# Patient Record
Sex: FEMALE | Race: WHITE | NOT HISPANIC OR LATINO
[De-identification: names, ages, dates, MRNs, and addresses within clinical notes are randomized per-mention and may not be internally consistent; named-entity substitution may affect disease eponyms.]

---

## 2017-02-28 ENCOUNTER — APPOINTMENT (OUTPATIENT)
Dept: PULMONOLOGY | Facility: CLINIC | Age: 62
End: 2017-02-28

## 2017-02-28 VITALS
SYSTOLIC BLOOD PRESSURE: 127 MMHG | DIASTOLIC BLOOD PRESSURE: 78 MMHG | BODY MASS INDEX: 48.82 KG/M2 | OXYGEN SATURATION: 95 % | RESPIRATION RATE: 17 BRPM | HEIGHT: 65 IN | WEIGHT: 293 LBS | HEART RATE: 90 BPM

## 2017-08-17 ENCOUNTER — APPOINTMENT (OUTPATIENT)
Dept: PULMONOLOGY | Facility: CLINIC | Age: 62
End: 2017-08-17

## 2017-09-15 ENCOUNTER — APPOINTMENT (OUTPATIENT)
Dept: PULMONOLOGY | Facility: CLINIC | Age: 62
End: 2017-09-15
Payer: MEDICARE

## 2017-09-15 VITALS
OXYGEN SATURATION: 96 % | HEART RATE: 99 BPM | BODY MASS INDEX: 48.82 KG/M2 | HEIGHT: 65 IN | DIASTOLIC BLOOD PRESSURE: 70 MMHG | RESPIRATION RATE: 16 BRPM | SYSTOLIC BLOOD PRESSURE: 130 MMHG | WEIGHT: 293 LBS

## 2017-09-15 PROCEDURE — 94010 BREATHING CAPACITY TEST: CPT

## 2017-09-15 PROCEDURE — 99214 OFFICE O/P EST MOD 30 MIN: CPT | Mod: 25

## 2017-09-15 RX ORDER — IPRATROPIUM BROMIDE 42 UG/1
0.06 SPRAY NASAL
Qty: 1 | Refills: 5 | Status: ACTIVE | COMMUNITY
Start: 2017-09-15 | End: 1900-01-01

## 2017-09-15 RX ORDER — IBUPROFEN 800 MG/1
800 TABLET, FILM COATED ORAL
Qty: 12 | Refills: 0 | Status: ACTIVE | COMMUNITY
Start: 2017-03-17

## 2017-12-21 ENCOUNTER — APPOINTMENT (OUTPATIENT)
Dept: PULMONOLOGY | Facility: CLINIC | Age: 62
End: 2017-12-21

## 2018-01-05 ENCOUNTER — RX RENEWAL (OUTPATIENT)
Age: 63
End: 2018-01-05

## 2018-01-25 ENCOUNTER — APPOINTMENT (OUTPATIENT)
Dept: PULMONOLOGY | Facility: CLINIC | Age: 63
End: 2018-01-25
Payer: MEDICARE

## 2018-01-25 VITALS — SYSTOLIC BLOOD PRESSURE: 130 MMHG | DIASTOLIC BLOOD PRESSURE: 80 MMHG | OXYGEN SATURATION: 98 % | HEART RATE: 96 BPM

## 2018-01-25 PROCEDURE — 94727 GAS DIL/WSHOT DETER LNG VOL: CPT

## 2018-01-25 PROCEDURE — 94060 EVALUATION OF WHEEZING: CPT

## 2018-01-25 PROCEDURE — 99214 OFFICE O/P EST MOD 30 MIN: CPT | Mod: 25

## 2018-01-25 PROCEDURE — 94729 DIFFUSING CAPACITY: CPT

## 2018-07-24 ENCOUNTER — NON-APPOINTMENT (OUTPATIENT)
Age: 63
End: 2018-07-24

## 2018-07-24 ENCOUNTER — APPOINTMENT (OUTPATIENT)
Dept: PULMONOLOGY | Facility: CLINIC | Age: 63
End: 2018-07-24
Payer: MEDICARE

## 2018-07-24 VITALS
HEIGHT: 65 IN | BODY MASS INDEX: 48.82 KG/M2 | DIASTOLIC BLOOD PRESSURE: 75 MMHG | WEIGHT: 293 LBS | HEART RATE: 84 BPM | OXYGEN SATURATION: 97 % | SYSTOLIC BLOOD PRESSURE: 136 MMHG

## 2018-07-24 DIAGNOSIS — M54.9 DORSALGIA, UNSPECIFIED: ICD-10-CM

## 2018-07-24 PROCEDURE — 95012 NITRIC OXIDE EXP GAS DETER: CPT

## 2018-07-24 PROCEDURE — 99214 OFFICE O/P EST MOD 30 MIN: CPT | Mod: 25

## 2018-07-24 PROCEDURE — 94010 BREATHING CAPACITY TEST: CPT

## 2018-07-24 RX ORDER — CLARITHROMYCIN 500 MG/1
500 TABLET, FILM COATED ORAL
Qty: 20 | Refills: 0 | Status: DISCONTINUED | COMMUNITY
Start: 2017-09-15 | End: 2018-07-24

## 2018-08-03 ENCOUNTER — APPOINTMENT (OUTPATIENT)
Dept: ANESTHESIOLOGY | Facility: CLINIC | Age: 63
End: 2018-08-03

## 2018-08-03 ENCOUNTER — OUTPATIENT (OUTPATIENT)
Dept: OUTPATIENT SERVICES | Facility: HOSPITAL | Age: 63
LOS: 1 days | End: 2018-08-03
Payer: MEDICARE

## 2018-08-03 DIAGNOSIS — Z96.651 PRESENCE OF RIGHT ARTIFICIAL KNEE JOINT: Chronic | ICD-10-CM

## 2018-08-03 DIAGNOSIS — Z98.89 OTHER SPECIFIED POSTPROCEDURAL STATES: Chronic | ICD-10-CM

## 2018-08-03 DIAGNOSIS — M54.12 RADICULOPATHY, CERVICAL REGION: ICD-10-CM

## 2018-08-03 DIAGNOSIS — Z96.652 PRESENCE OF LEFT ARTIFICIAL KNEE JOINT: Chronic | ICD-10-CM

## 2018-08-03 PROCEDURE — 62321 NJX INTERLAMINAR CRV/THRC: CPT

## 2018-08-15 ENCOUNTER — EMERGENCY (EMERGENCY)
Facility: HOSPITAL | Age: 63
LOS: 1 days | Discharge: HOME | End: 2018-08-15
Attending: EMERGENCY MEDICINE
Payer: MEDICARE

## 2018-08-15 VITALS
HEIGHT: 65 IN | RESPIRATION RATE: 16 BRPM | HEART RATE: 79 BPM | WEIGHT: 293 LBS | SYSTOLIC BLOOD PRESSURE: 108 MMHG | OXYGEN SATURATION: 99 % | TEMPERATURE: 99 F | DIASTOLIC BLOOD PRESSURE: 72 MMHG

## 2018-08-15 VITALS
HEART RATE: 67 BPM | DIASTOLIC BLOOD PRESSURE: 76 MMHG | SYSTOLIC BLOOD PRESSURE: 119 MMHG | OXYGEN SATURATION: 100 % | RESPIRATION RATE: 18 BRPM | TEMPERATURE: 98 F

## 2018-08-15 DIAGNOSIS — Z96.652 PRESENCE OF LEFT ARTIFICIAL KNEE JOINT: Chronic | ICD-10-CM

## 2018-08-15 DIAGNOSIS — Z98.89 OTHER SPECIFIED POSTPROCEDURAL STATES: Chronic | ICD-10-CM

## 2018-08-15 DIAGNOSIS — Z96.651 PRESENCE OF RIGHT ARTIFICIAL KNEE JOINT: Chronic | ICD-10-CM

## 2018-08-15 PROCEDURE — 99283 EMERGENCY DEPT VISIT LOW MDM: CPT | Mod: 25

## 2018-08-15 PROCEDURE — G0168: CPT | Mod: GC

## 2018-08-15 PROCEDURE — 99283 EMERGENCY DEPT VISIT LOW MDM: CPT | Mod: 25,GC

## 2018-08-15 PROCEDURE — 12011 RPR F/E/E/N/L/M 2.5 CM/<: CPT

## 2018-08-15 RX ADMIN — Medication 1 TABLET(S): at 20:13

## 2018-08-15 NOTE — ED PROVIDER NOTE - MEDICAL DECISION MAKING DETAILS
63-year-old female presenting with left narrow laceration status post dog bite. Minimal bleeding from 3 mm avulsion of the skin. Dog is neighbor's dog with vaccines. Very low concern for rabies. Will copiously irrigate, apply tissue adhesive. 63-year-old female presenting with left narrow laceration status post dog bite. Minimal bleeding from 3 mm avulsion of the skin. Dog is neighbor's dog with vaccines. Very low concern for rabies. Will copiously irrigate, apply tissue adhesive.  Attending Statement: Agree with the above.  Provided c strict return precautions re s/s of infection.  To f/u c PCP.  Wound copiously irrigated prior to closure.  Tdap utd, dog has rabies vaccines.  D/C on augmentin.  --BMM

## 2018-08-15 NOTE — ED PROVIDER NOTE - PLAN OF CARE
You were seen in the Emergency Department for dog bit. Your laceration was repaired. Take the antibiotics as written. Please follow up with your regular physician this week for reevaluation. Please return to the Emergency Department if you have any new concerning symptoms such as severe pain, weakness, or any other concerning symptoms.

## 2018-08-15 NOTE — ED PROVIDER NOTE - PMH
Aortic stenosis  dx 1-2 year ago  Arthritis    Asthma    Breakdown (mechanical) of GI prosth dev/grft, init  lap band malfunction  Depression    Dysphagia, unspecified type    Malignant neoplasm of left female breast, unspecified site of breast  2015  radiation   surgery  oral medication  Morbid obesity, unspecified obesity type  lap band 2009  Pulmonary emphysema, unspecified emphysema type    PVD (peripheral vascular disease)

## 2018-08-15 NOTE — ED PROVIDER NOTE - OBJECTIVE STATEMENT
63-year-old female presenting with left nose dog bite. Patient reports a neighbor's dog bit her on the nose, patient placed pressure to the site to achieve hemostasis. Patient reports that dog is all vaccines. Patient denies any other trauma or any injuries to the time he, weakness, lightheadedness. 63-year-old female presenting with left nose dog bite. Patient reports a neighbor's dog bit her on the nose, patient placed pressure to the site to achieve hemostasis. Patient reports that dog is all vaccines. Patient denies any other trauma or any injuries to the time he, weakness, lightheadedness.  Tdap utd.

## 2018-08-15 NOTE — ED ADULT NURSE NOTE - OBJECTIVE STATEMENT
62 y/o F patient presents to ED from home c/o dog bite. Patient states she was walking her dog when neighbor's dog bit her on the left side of her nose. Patient presents with small laceration on lower portion of left side of nose. Patient states she had mild bleeding post bite. No active bleeding present. Patient c/o mild pain from site. Patient A&Ox3. speech clear and coherent. abdomen soft and non tender. ambulatory. Patient denies HA, dizziness, SOB, chest pain, abdominal pain, N/V/D, bowel/bladder changes, fevers/chills. VSS. Safety and comfort measures provided and maintained. MD bedside.

## 2018-08-15 NOTE — ED PROVIDER NOTE - PSH
C Section  1986 1988  H/O total knee replacement, left  2012  History of Cholecystectomy  1989  History of Hernia Repair  umbilical  1993  S/P lumpectomy, left breast  nodes x 4 removed   2015 breast cancer  S/P tendon repair  quad  right  2010  Status post total right knee replacement  2010

## 2018-08-15 NOTE — ED PROVIDER NOTE - CARE PLAN
Principal Discharge DX:	Dog bite, initial encounter  Assessment and plan of treatment:	You were seen in the Emergency Department for dog bit. Your laceration was repaired. Take the antibiotics as written. Please follow up with your regular physician this week for reevaluation. Please return to the Emergency Department if you have any new concerning symptoms such as severe pain, weakness, or any other concerning symptoms.

## 2018-08-31 ENCOUNTER — OUTPATIENT (OUTPATIENT)
Dept: OUTPATIENT SERVICES | Facility: HOSPITAL | Age: 63
LOS: 1 days | End: 2018-08-31
Payer: MEDICARE

## 2018-08-31 ENCOUNTER — APPOINTMENT (OUTPATIENT)
Dept: ANESTHESIOLOGY | Facility: CLINIC | Age: 63
End: 2018-08-31

## 2018-08-31 DIAGNOSIS — M54.12 RADICULOPATHY, CERVICAL REGION: ICD-10-CM

## 2018-08-31 DIAGNOSIS — Z98.89 OTHER SPECIFIED POSTPROCEDURAL STATES: Chronic | ICD-10-CM

## 2018-08-31 DIAGNOSIS — Z96.651 PRESENCE OF RIGHT ARTIFICIAL KNEE JOINT: Chronic | ICD-10-CM

## 2018-08-31 DIAGNOSIS — Z96.652 PRESENCE OF LEFT ARTIFICIAL KNEE JOINT: Chronic | ICD-10-CM

## 2018-08-31 PROCEDURE — 62321 NJX INTERLAMINAR CRV/THRC: CPT

## 2018-11-27 ENCOUNTER — NON-APPOINTMENT (OUTPATIENT)
Age: 63
End: 2018-11-27

## 2018-11-27 ENCOUNTER — APPOINTMENT (OUTPATIENT)
Dept: PULMONOLOGY | Facility: CLINIC | Age: 63
End: 2018-11-27
Payer: MEDICARE

## 2018-11-27 VITALS
SYSTOLIC BLOOD PRESSURE: 128 MMHG | BODY MASS INDEX: 48.82 KG/M2 | HEART RATE: 65 BPM | HEIGHT: 65 IN | DIASTOLIC BLOOD PRESSURE: 70 MMHG | WEIGHT: 293 LBS | OXYGEN SATURATION: 98 %

## 2018-11-27 PROCEDURE — ZZZZZ: CPT

## 2018-11-27 PROCEDURE — 94010 BREATHING CAPACITY TEST: CPT

## 2018-11-27 PROCEDURE — 99214 OFFICE O/P EST MOD 30 MIN: CPT | Mod: 25

## 2018-11-27 RX ORDER — AZELASTINE HYDROCHLORIDE 205.5 UG/1
0.15 SPRAY, METERED NASAL TWICE DAILY
Qty: 3 | Refills: 1 | Status: ACTIVE | COMMUNITY
Start: 2018-11-27 | End: 1900-01-01

## 2018-11-27 NOTE — PROCEDURE
[FreeTextEntry1] : She had an MRI performed on 7/25/2018 that revealed mild thoracic disc bulging with small central disc herniations at T6-T7 and T8-T9 as well as a right paracentral disc herniation at T9-T10. There is no significant central or foraminal stenosis. Scoliosis and degenerative disc disease of the thoracic spine. \par \par PFT- spi reveals mild obstructive dysfunction; FEV1 was 1.96L which is 77% of predicted; normal flow volume loop

## 2018-11-27 NOTE — HISTORY OF PRESENT ILLNESS
[FreeTextEntry1] : Ms. Grace is a 64 y/o female who presents to the office for a follow up visit for abnormal chest CT, asthma, lung nodules, JETT, emphysema, SOB. Her chief complaint is poor sleep. \par - She comes in stating that she feels she is allergic to winter. She does not currently have a cold, but she notes have a frequent leaky nose. \par - She describes her sleep patterns as weird. \par - She notes going to sleep late. \par - She is currently consider using medical marijuana in order to improve her sleeping. \par - She is using her CPAP machine, but it not tolerating it well. \par - She reports intermittent dysphonia. \par - She denies any headaches, nausea, vomiting, fever, chills, sweats, chest pain, chest pressure, palpitations, SOB, coughing, wheezing, fatigue, diarrhea, constipation, dysphagia, myalgias, dizziness, leg swelling, leg pain, itchy eyes, itchy ears, heartburn, reflux, or sour taste in the mouth.

## 2018-11-27 NOTE — ASSESSMENT
[FreeTextEntry1] : Ms. Grace is a 62 year old female with a history of aortic stenosis, obesity s/p lap band surgery placed and removed, HLD, HTN, breast cancer, JETT, COPD, asthma, and allergic rhinitis.She presents to the office today feeling improved. \par \par problem 1: allergies/sinus\par -continue Atrovent 0.6% 1 sniff each nostril BID\par - Add Astelin 0.15 2 sniffs BID\par \par problem 2: asthma\par -continue nebulizer with DuoNeb QID prn\par -Continue Advair 250 at 1 inhalation BID\par -continue Spiriva 1 inhalation QD\par -continue Singulair 10 mg QHS \par \par -Asthma is  believed to be caused by inherited (genetic) and environmental factor, but its exact cause is unknown. Asthma may be triggered by allergens, lung infections, or irritants in the air. Asthma triggers are different for each person\par -Inhaler technique reviewed as well as oral hygiene techniques reviewed with patient. Avoidance of cold air, extremes of temperature, rescue inhaler should be used before exercise. Order of medication reviewed with patient. Recommended use of a cool mist humidifier in the bedroom.\par \par problem 3: lung cancer screening- Stable \par -f/u chest CT 6/2019\par \par problem 4: obesity \par -Weight loss, exercise, and diet control were discussed and are highly encouraged. Treatment options were given such as, aqua therapy, and contacting a nutritionist. Recommended to use the elliptical, stationary bike, less use of treadmill.  Obesity is associated with worsening asthma, shortness of breath, and potential for cardiac disease, diabetes, and other underlying medical conditions.\par \par problem 5: JETT\par -continue to use the CPAP machine, tolerating it well, and seeing the benefits\par \par -Sleep apnea is associated with adverse clinical consequences which an affect most organ systems.  Cardiovascular disease risk includes arrhythmias, atrial fibrillation, hypertension, coronary artery disease, and stroke. Metabolic disorders include diabetes type 2, non-alcoholic fatty liver disease. Mood disorder especially depression; and cognitive decline especially in the elderly. Associations with  chronic reflux/Arauz’s esophagus some but not all inclusive. \par -Reasons to assess this include arousal consistent with hypopnea; respiratory events most prominent in REM sleep or supine position; therefore sleep staging and body position are important for accurate diagnosis and estimation of AHI.\par \par problem 7: cardiac disease\par -f/u with Dr. Goldberg\par \par Problem 8: NM disease\par - Continue Lyrica 50 mg QD\par - S/p thoracic MRI without contrast: T9-T10 herniation- U/P "shots"\par \par problem 8: health maintenance\par - She receive an influenza vaccine 2018\par -recommended strep pneumonia vaccines: Prevnar-13 vaccine, followed by Pneumo vaccine 23 on year following\par -recommended early intervention for URIs\par -recommended osteoporosis evaluations\par -recommended early dermatological evaluations\par -recommended after the age of 50 to the age of 70, colonoscopy every 5 years\par -encouraged early intervention\par \par F/U in 4 months\par She is encouraged to call with any changes, concerns, or questions

## 2018-11-27 NOTE — PHYSICAL EXAM

## 2018-11-27 NOTE — ADDENDUM
[FreeTextEntry1] : Documented by Jules Sin acting as a scribe for Dr. Virgilio Marie on 11/27/18\par \par All medical record entries made by the Scribe were at my, Dr. Virgilio Marie's, direction and personally dictated by me on 11/27/18. I have reviewed the chart and agree that the record accurately reflects my personal performance of the history, physical exam, assessment and plan. I have also personally directed, reviewed, and agree with the discharge instructions. \par \par \par \par \par

## 2018-11-27 NOTE — REASON FOR VISIT
[Follow-Up] : a follow-up visit [FreeTextEntry1] : abnormal chest CT, asthma, lung nodules, JETT, emphysema, SOB

## 2019-03-12 ENCOUNTER — TRANSCRIPTION ENCOUNTER (OUTPATIENT)
Age: 64
End: 2019-03-12

## 2019-03-26 ENCOUNTER — APPOINTMENT (OUTPATIENT)
Dept: PULMONOLOGY | Facility: CLINIC | Age: 64
End: 2019-03-26
Payer: MEDICARE

## 2019-03-26 ENCOUNTER — NON-APPOINTMENT (OUTPATIENT)
Age: 64
End: 2019-03-26

## 2019-03-26 VITALS
DIASTOLIC BLOOD PRESSURE: 80 MMHG | RESPIRATION RATE: 16 BRPM | BODY MASS INDEX: 48.82 KG/M2 | SYSTOLIC BLOOD PRESSURE: 130 MMHG | OXYGEN SATURATION: 96 % | HEART RATE: 73 BPM | HEIGHT: 65 IN | WEIGHT: 293 LBS

## 2019-03-26 PROCEDURE — 94010 BREATHING CAPACITY TEST: CPT

## 2019-03-26 PROCEDURE — 99214 OFFICE O/P EST MOD 30 MIN: CPT | Mod: 25

## 2019-03-26 PROCEDURE — 95012 NITRIC OXIDE EXP GAS DETER: CPT

## 2019-03-26 NOTE — ADDENDUM
[FreeTextEntry1] : Documented by Jules Sin acting as a scribe for Dr. Virgilio Marie on 3/26/2019\par \par All medical record entries made by the Scribe were at my, Dr. Virgilio Marie's, direction and personally dictated by me on 3/26/2019. I have reviewed the chart and agree that the record accurately reflects my personal performance of the history, physical exam, assessment and plan. I have also personally directed, reviewed, and agree with the discharge instructions. \par \par \par \par \par

## 2019-03-26 NOTE — PROCEDURE
[FreeTextEntry1] : PFT- spi reveals normal flows; FEV1 was 1.85L which is 73% of predicted; normal lung volumes; normal diffusion at [], which is % of predicted; normal flow volume loop mild restrcitive \par \par FENO was 10; a normal value being less than 25\par Fractional exhaled nitric oxide (FENO) is regarded as a simple, noninvasive method for assessing eosinophilic airway inflammation. Produced by a variety of cells within the lung, nitric oxide (NO) concentrations are generally low in healthy individuals. However, high concentrations of NO appear to be involved in nonspecific host defense mechanisms and chronic inflammatory diseases such as asthma. The American Thoracic Society (ATS) therefore has recommended using FENO to aid in the diagnosis and monitoring of eosinophilic airway inflammation and asthma, and for identifying steroid responsive individuals whose chronic respiratory symptoms may be caused by airway inflammation.

## 2019-03-26 NOTE — ASSESSMENT
[FreeTextEntry1] : Ms. Grace is a 62 year old female with a history of aortic stenosis, obesity s/p lap band surgery placed and removed, HLD, HTN, breast cancer, JETT, COPD, asthma, and allergic rhinitis.She presents to the office today in the midst of a URI-induced asthmatic bronchitis. \par \par problem 1: allergies/sinus\par -continue Atrovent 0.6% 1 sniff each nostril BID\par - Continue Astelin 0.15 2 sniffs BID\par \par problem 2: asthma (active)\par - Add a course of prednisone: 20 mg for 7 days and 10 mg for 7 days \par Information sheet given to the patient to be reviewed, this medication is never to be used without consulting the prescribing physician. Proper dietary restraint is necessary specifically salt containing foods, if any reaction may occur should be reported. \par \par -continue nebulizer with DuoNeb QID prn\par -Continue Advair 250 at 1 inhalation BID\par -continue Spiriva 1 inhalation QD\par -continue Singulair 10 mg QHS \par \par -Asthma is  believed to be caused by inherited (genetic) and environmental factor, but its exact cause is unknown. Asthma may be triggered by allergens, lung infections, or irritants in the air. Asthma triggers are different for each person\par -Inhaler technique reviewed as well as oral hygiene techniques reviewed with patient. Avoidance of cold air, extremes of temperature, rescue inhaler should be used before exercise. Order of medication reviewed with patient. Recommended use of a cool mist humidifier in the bedroom.\par \par problem 3: lung cancer screening- Stable \par -f/u chest CT 6/2019\par \par problem 4: obesity \par -Weight loss, exercise, and diet control were discussed and are highly encouraged. Treatment options were given such as, aqua therapy, and contacting a nutritionist. Recommended to use the elliptical, stationary bike, less use of treadmill.  Obesity is associated with worsening asthma, shortness of breath, and potential for cardiac disease, diabetes, and other underlying medical conditions.\par \par problem 5: JETT\par -continue to use the CPAP machine, tolerating it well, and seeing the benefits\par \par -Sleep apnea is associated with adverse clinical consequences which an affect most organ systems.  Cardiovascular disease risk includes arrhythmias, atrial fibrillation, hypertension, coronary artery disease, and stroke. Metabolic disorders include diabetes type 2, non-alcoholic fatty liver disease. Mood disorder especially depression; and cognitive decline especially in the elderly. Associations with  chronic reflux/Arauz’s esophagus some but not all inclusive. \par -Reasons to assess this include arousal consistent with hypopnea; respiratory events most prominent in REM sleep or supine position; therefore sleep staging and body position are important for accurate diagnosis and estimation of AHI.\par \par problem 7: cardiac disease\par -f/u with Dr. Goldberg\par \par Problem 8: NM disease\par - Continue Lyrica 50 mg QD\par - S/p thoracic MRI without contrast: T9-T10 herniation- U/P "shots"\par \par problem 8: health maintenance\par - She receive an influenza vaccine 2018\par -recommended strep pneumonia vaccines: Prevnar-13 vaccine, followed by Pneumo vaccine 23 on year following\par -recommended early intervention for URIs\par -recommended osteoporosis evaluations\par -recommended early dermatological evaluations\par -recommended after the age of 50 to the age of 70, colonoscopy every 5 years\par -encouraged early intervention\par \par F/U in 4 months\par She is encouraged to call with any changes, concerns, or questions

## 2019-03-26 NOTE — PHYSICAL EXAM

## 2019-03-26 NOTE — HISTORY OF PRESENT ILLNESS
[FreeTextEntry1] : Ms. Grace is a 62 y/o female with a history of abnormal chest CT, asthma, lung nodules, JETT, emphysema, SOB presenting to the office for a sick visit. Her chief complaint is PND \par - She became ill about 9 days ago beginning with a terrible headache and PND. She states that in the midst of this she had some dizziness and ear pain. She thought it was a sinusitis. When it didn't clear, she went to a walk-in center, where she was prescribed Augmentin and instructed to use her Flonase. She was formerly using OTC Sudafed. \par - She is not producing a lot of mucus currently but was prior to going to the walk in clinic\par - She occasionally brings up some yellow phlegm. \par - She notes that she does not sleep supine \par -  She denies any nausea, vomiting, fever, chills, sweats, chest pain, chest pressure, palpitations, diarrhea, constipation, dysphagia, myalgias, leg swelling, leg pain, itchy eyes, itchy ears, heartburn, reflux, or sour taste in the mouth.

## 2019-05-30 ENCOUNTER — APPOINTMENT (OUTPATIENT)
Dept: PULMONOLOGY | Facility: CLINIC | Age: 64
End: 2019-05-30
Payer: MEDICARE

## 2019-05-30 VITALS
HEART RATE: 77 BPM | HEIGHT: 65 IN | SYSTOLIC BLOOD PRESSURE: 128 MMHG | RESPIRATION RATE: 16 BRPM | OXYGEN SATURATION: 99 % | WEIGHT: 293 LBS | BODY MASS INDEX: 48.82 KG/M2 | DIASTOLIC BLOOD PRESSURE: 82 MMHG

## 2019-05-30 PROCEDURE — 71046 X-RAY EXAM CHEST 2 VIEWS: CPT

## 2019-05-30 PROCEDURE — 99214 OFFICE O/P EST MOD 30 MIN: CPT | Mod: 25

## 2019-05-30 NOTE — REASON FOR VISIT
[Follow-Up] : a follow-up visit [FreeTextEntry1] : abnormal chest CT, asthma, allergic rhinitis, lung nodule, morbid obesity, JETT, other emphysema, and SOB

## 2019-05-30 NOTE — ASSESSMENT
[FreeTextEntry1] : Ms. Grace is a 62 year old female with a history of aortic stenosis, obesity s/p lap band surgery placed and removed, HLD, HTN, breast cancer, JETT, COPD, asthma, and allergic rhinitis.She presents to the office today in the midst of a URI-induced asthmatic bronchitis. \par \par problem 1: allergies/sinus\par -continue Atrovent 0.6% 1 sniff each nostril BID\par -Continue Astelin 0.15 2 sniffs BID\par \par problem 2: asthma (active)\par -add Prednisone 30 mg x 5 days, 20 mg x 5 days, 10 mg x 5 days \par Information sheet given to the patient to be reviewed, this medication is never to be used without consulting the prescribing physician. Proper dietary restraint is necessary specifically salt containing foods, if any reaction may occur should be reported. \par \par -continue nebulizer with DuoNeb QID prn\par -Continue Advair 250 at 1 inhalation BID\par -continue Spiriva 1 inhalation QD\par -continue Singulair 10 mg QHS \par \par -Asthma is  believed to be caused by inherited (genetic) and environmental factor, but its exact cause is unknown. Asthma may be triggered by allergens, lung infections, or irritants in the air. Asthma triggers are different for each person\par -Inhaler technique reviewed as well as oral hygiene techniques reviewed with patient. Avoidance of cold air, extremes of temperature, rescue inhaler should be used before exercise. Order of medication reviewed with patient. Recommended use of a cool mist humidifier in the bedroom.\par \par problem 2A: acute bronchitis \par -add Biaxin 500 mg BID  - 20 days\par You have a clinical scenario most c/w acute bronchitis the etiology of which is unknown but empiric antibiotics are indicated. Hydration, mucolytics including Mucinex, Robitussin and the like are indicated. Cough controlling agents will be needed. \par \par problem 3: lung cancer screening- Stable \par -f/u chest CT 6/2019\par \par problem 4: obesity \par -Weight loss, exercise, and diet control were discussed and are highly encouraged. Treatment options were given such as, aqua therapy, and contacting a nutritionist. Recommended to use the elliptical, stationary bike, less use of treadmill.  Obesity is associated with worsening asthma, shortness of breath, and potential for cardiac disease, diabetes, and other underlying medical conditions.\par \par problem 5: JETT\par -continue to use the CPAP machine, tolerating it well, and seeing the benefits\par \par -Sleep apnea is associated with adverse clinical consequences which an affect most organ systems.  Cardiovascular disease risk includes arrhythmias, atrial fibrillation, hypertension, coronary artery disease, and stroke. Metabolic disorders include diabetes type 2, non-alcoholic fatty liver disease. Mood disorder especially depression; and cognitive decline especially in the elderly. Associations with  chronic reflux/Arauz’s esophagus some but not all inclusive. \par -Reasons to assess this include arousal consistent with hypopnea; respiratory events most prominent in REM sleep or supine position; therefore sleep staging and body position are important for accurate diagnosis and estimation of AHI.\par \par problem 7: cardiac disease\par -f/u with Dr. Goldberg\par \par Problem 8: NM disease\par - Continue Lyrica 50 mg QD\par - S/p thoracic MRI without contrast: T9-T10 herniation- U/P "shots"\par \par problem 8: health maintenance\par - She receive an influenza vaccine 2018\par -recommended strep pneumonia vaccines: Prevnar-13 vaccine, followed by Pneumo vaccine 23 on year following\par -recommended early intervention for URIs\par -recommended osteoporosis evaluations\par -recommended early dermatological evaluations\par -recommended after the age of 50 to the age of 70, colonoscopy every 5 years\par -encouraged early intervention\par \par F/U in 4 months\par She is encouraged to call with any changes, concerns, or questions

## 2019-05-30 NOTE — HISTORY OF PRESENT ILLNESS
[FreeTextEntry1] : Ms. Grace is a 64 year old male with a history of abnormal chest CT, asthma, allergic rhinitis, lung nodule, morbid obesity, JETT, other emphysema, and SOB presenting to the office today for a follow up visit. His chief complaint is productive cough. \par -she reports that she has had a cough and PND for about 2 weeks after spending a weekend in a dirty apartment. since returning home after that weekend, her cough has been getting progressively worse. she began taking Mucinex and using Flonase but was not seeing any improvement , so she self-started Prednisone 20 mg 4 days ago\par -prior to getting sick 2 weeks ago, she was feeling generally fine\par -she states that  she gets a frequent head ache / ear ache\par -she reports a metallic taste while taking prednisone\par -she notes that she has a bone infection in her upper maxilla, and it hurts when she touches it\par -she has been using her inhaler regularly\par -she states that she has been taking oxycodone as needed since she hurt her ankle. the pain medication has been making her constipated.\par -she denies any nausea, vomiting, fever, chills, sweats, chest pain, chest pressure, diarrhea, dysphagia, dizziness, leg swelling, leg pain, itchy eyes, itchy ears, heartburn, reflux.

## 2019-05-30 NOTE — PHYSICAL EXAM

## 2019-05-30 NOTE — ADDENDUM
[FreeTextEntry1] : All medical record entries made by yudy Hoang were at Dr. Virgilio Marie's, direction and personally dictated by me on 05/30/2019. I have reviewed the chart and agree that the record accurately reflects my personal performance of the history, physical exam, assessment and plan. I have also personally directed, reviewed, and agree with the discharge instructions.

## 2019-05-30 NOTE — PROCEDURE
[FreeTextEntry1] : CXR reveals a normal sized heart; no evidence of infiltrate or effusion--a normal appearing chest radiograph

## 2019-07-03 ENCOUNTER — EMERGENCY (EMERGENCY)
Facility: HOSPITAL | Age: 64
LOS: 1 days | Discharge: HOME | End: 2019-07-03
Attending: EMERGENCY MEDICINE
Payer: MEDICARE

## 2019-07-03 VITALS
DIASTOLIC BLOOD PRESSURE: 72 MMHG | HEART RATE: 80 BPM | OXYGEN SATURATION: 96 % | SYSTOLIC BLOOD PRESSURE: 109 MMHG | RESPIRATION RATE: 18 BRPM | TEMPERATURE: 98 F

## 2019-07-03 VITALS
DIASTOLIC BLOOD PRESSURE: 83 MMHG | RESPIRATION RATE: 19 BRPM | OXYGEN SATURATION: 93 % | WEIGHT: 293 LBS | SYSTOLIC BLOOD PRESSURE: 130 MMHG | HEART RATE: 72 BPM | HEIGHT: 65 IN | TEMPERATURE: 98 F

## 2019-07-03 DIAGNOSIS — Z98.89 OTHER SPECIFIED POSTPROCEDURAL STATES: Chronic | ICD-10-CM

## 2019-07-03 DIAGNOSIS — Z96.651 PRESENCE OF RIGHT ARTIFICIAL KNEE JOINT: Chronic | ICD-10-CM

## 2019-07-03 DIAGNOSIS — Z96.652 PRESENCE OF LEFT ARTIFICIAL KNEE JOINT: Chronic | ICD-10-CM

## 2019-07-03 PROCEDURE — 73610 X-RAY EXAM OF ANKLE: CPT

## 2019-07-03 PROCEDURE — 99284 EMERGENCY DEPT VISIT MOD MDM: CPT | Mod: 25

## 2019-07-03 PROCEDURE — 73610 X-RAY EXAM OF ANKLE: CPT | Mod: 26,RT

## 2019-07-03 PROCEDURE — 93971 EXTREMITY STUDY: CPT | Mod: 26

## 2019-07-03 PROCEDURE — 99284 EMERGENCY DEPT VISIT MOD MDM: CPT

## 2019-07-03 PROCEDURE — 93971 EXTREMITY STUDY: CPT

## 2019-07-03 RX ADMIN — Medication 300 MILLIGRAM(S): at 16:55

## 2019-07-03 NOTE — ED PROVIDER NOTE - OBJECTIVE STATEMENT
63 y/o female PMHx Aortic Stenosis, HTN. HLD, Asthma/emphysema on Advair (never hospitalizations/intubated), presented to the ED c/o erythema, swelling, pain and "lump" to right medial ankle x1 day. Patient has pain with ambulation and concerned she may have a blood clot due to swelling. Patient had cortisone injection 4 weeks ago for tendinitis which was injected to lateral right ankle and plantar surface. Took oxycodone few weeks ago with some improvement of symptoms. Patient denied fever chills, CP. SOB, RHOADES. abdominal pain, urinary complaints, drainage, trauma, insect bites, numbness, weakness headache   cortisone shot 4-6 weeks ago

## 2019-07-03 NOTE — ED PROVIDER NOTE - NSFOLLOWUPINSTRUCTIONS_ED_ALL_ED_FT
(1) You will need to follow-up with your PMD in 2-3 days for your skin infection. A copy of your results were given with you to bring to your appt.  (2) Be sure to review attached discharge paperwork.  (3) Drink plenty of fluids to stay hydrated.  (4) Continue your home medications as directed ALONG WITH your antibiotic clindamycin 300mg every 6 hours for the next week   (5) Use Tylenol (extra strength over-the-counter) or Motrin (600mg which is three 200mg over-the-counter tablets at once every 6hrs) for your fevers or pain.  (6) Use warm compresses to the affected area for 20min at a time several times/day for next few days. Be careful not to burn your skin.  (7) Return to ER for uncontrolled fever, severe pain, trouble keeping down fluids, spread of infection or any other concerns.

## 2019-07-03 NOTE — ED ADULT NURSE NOTE - OBJECTIVE STATEMENT
64 year old female patients to ED c/o redness and "lump in R inner ankle" which she just noticed today. Patient states she has been treated for tendonitis to R foot with cortisone shot x 4-6 weeks ago and is still reporting pain to foot, but unchanged in weeks. Patient has chronic bruising to inner ankles, and states she "felt a small lump" next to one of the bruises on medial aspect to R ankle. Patient denies known trauma, bug bites, injuries. Small pea sized lump noted to medial aspect of R ankle with surrounding redness, tender upon palpation. Patient denies fevers, chills, discharge, and is reporting tolerable pain. Patient aware of plan of care for US. Resting comfortably in bed with daughter at bedside.

## 2019-07-03 NOTE — ED PROVIDER NOTE - ATTENDING CONTRIBUTION TO CARE
I performed a history and physical exam of the patient and discussed their management with the resident/ACP. I reviewed the resident/ACP's note and agree with the documented findings and plan of care.  64F    ROS: as above    PMHx/PSHx: as above    PE:  VS normal  GEN: No acute distress, resting comfortably, ambulating without difficulty  HEENT: PERRL, EOMI  CV: Regular rate, rhythm  LUNGS: No wheezes, rales, ronchi  Abdomen: Soft, non-tender, non-distended  SKIN: RLE well circumscribed erythema 2x2 with warmth to right medial aspect of tibia.   EXT: RLE TTP over erythema. Pulses intact 2+ B/L lower extremities. Very small pea sized palpable lump.    Impression:  Mild cellulitis, no recent prior outpatient therapy  -Imaging  -Oral Abx  -F/u with PMD I performed a history and physical exam of the patient and discussed their management with the resident/ACP. I reviewed the resident/ACP's note and agree with the documented findings and plan of care.  64F presented to the ED c/o erythema, swelling, pain and "lump" to right medial ankle x1 day. Patient has pain with ambulation and concerned she may have a blood clot due to swelling. Patient had cortisone injection 4 weeks ago for tendinitis which was injected to lateral right ankle and plantar surface. Took oxycodone few weeks ago with some improvement of symptoms.    ROS: as above    PMHx/PSHx: as above    PE:  VS normal  GEN: No acute distress, resting comfortably, ambulating without difficulty  HEENT: PERRL, EOMI  CV: Regular rate, rhythm  LUNGS: No wheezes, rales, ronchi  Abdomen: Soft, non-tender, non-distended  SKIN: RLE well circumscribed erythema 2x2 with warmth to right medial aspect of tibia.   EXT: RLE TTP over erythema. Pulses intact 2+ B/L lower extremities. Very small pea sized palpable lump.    Impression:  Mild cellulitis, no recent prior outpatient therapy  -Imaging  -Oral Abx  -F/u with PMD

## 2019-07-03 NOTE — ED PROVIDER NOTE - PHYSICAL EXAMINATION
Skin: well circumscribed erythema and warmth to right medial aspect of ankle. TTP. pulses intact b/l lower extremities. pea sized palpable lump

## 2019-07-21 ENCOUNTER — EMERGENCY (EMERGENCY)
Facility: HOSPITAL | Age: 64
LOS: 1 days | Discharge: HOME | End: 2019-07-21
Attending: EMERGENCY MEDICINE
Payer: MEDICARE

## 2019-07-21 VITALS
OXYGEN SATURATION: 98 % | TEMPERATURE: 98 F | WEIGHT: 293 LBS | SYSTOLIC BLOOD PRESSURE: 128 MMHG | RESPIRATION RATE: 17 BRPM | HEART RATE: 82 BPM | DIASTOLIC BLOOD PRESSURE: 80 MMHG | HEIGHT: 65 IN

## 2019-07-21 VITALS
SYSTOLIC BLOOD PRESSURE: 129 MMHG | OXYGEN SATURATION: 97 % | TEMPERATURE: 98 F | DIASTOLIC BLOOD PRESSURE: 74 MMHG | RESPIRATION RATE: 17 BRPM | HEART RATE: 78 BPM

## 2019-07-21 DIAGNOSIS — Z98.89 OTHER SPECIFIED POSTPROCEDURAL STATES: Chronic | ICD-10-CM

## 2019-07-21 DIAGNOSIS — Z96.651 PRESENCE OF RIGHT ARTIFICIAL KNEE JOINT: Chronic | ICD-10-CM

## 2019-07-21 DIAGNOSIS — Z96.652 PRESENCE OF LEFT ARTIFICIAL KNEE JOINT: Chronic | ICD-10-CM

## 2019-07-21 LAB
ALBUMIN SERPL ELPH-MCNC: 3.6 G/DL — SIGNIFICANT CHANGE UP (ref 3.3–5)
ALP SERPL-CCNC: 73 U/L — SIGNIFICANT CHANGE UP (ref 40–120)
ALT FLD-CCNC: 20 U/L — SIGNIFICANT CHANGE UP (ref 10–45)
ANION GAP SERPL CALC-SCNC: 12 MMOL/L — SIGNIFICANT CHANGE UP (ref 5–17)
AST SERPL-CCNC: 33 U/L — SIGNIFICANT CHANGE UP (ref 10–40)
BASE EXCESS BLDV CALC-SCNC: 0.3 MMOL/L — SIGNIFICANT CHANGE UP (ref -2–2)
BASOPHILS # BLD AUTO: 0.1 K/UL — SIGNIFICANT CHANGE UP (ref 0–0.2)
BASOPHILS NFR BLD AUTO: 0.6 % — SIGNIFICANT CHANGE UP (ref 0–2)
BILIRUB SERPL-MCNC: 0.2 MG/DL — SIGNIFICANT CHANGE UP (ref 0.2–1.2)
BUN SERPL-MCNC: 19 MG/DL — SIGNIFICANT CHANGE UP (ref 7–23)
CA-I SERPL-SCNC: 1.27 MMOL/L — SIGNIFICANT CHANGE UP (ref 1.12–1.3)
CALCIUM SERPL-MCNC: 9.2 MG/DL — SIGNIFICANT CHANGE UP (ref 8.4–10.5)
CHLORIDE BLDV-SCNC: 108 MMOL/L — SIGNIFICANT CHANGE UP (ref 96–108)
CHLORIDE SERPL-SCNC: 103 MMOL/L — SIGNIFICANT CHANGE UP (ref 96–108)
CO2 BLDV-SCNC: 28 MMOL/L — SIGNIFICANT CHANGE UP (ref 22–30)
CO2 SERPL-SCNC: 23 MMOL/L — SIGNIFICANT CHANGE UP (ref 22–31)
CREAT SERPL-MCNC: 0.58 MG/DL — SIGNIFICANT CHANGE UP (ref 0.5–1.3)
EOSINOPHIL # BLD AUTO: 0.3 K/UL — SIGNIFICANT CHANGE UP (ref 0–0.5)
EOSINOPHIL NFR BLD AUTO: 3.5 % — SIGNIFICANT CHANGE UP (ref 0–6)
ERYTHROCYTE [SEDIMENTATION RATE] IN BLOOD: 28 MM/HR — HIGH (ref 0–20)
GAS PNL BLDV: 137 MMOL/L — SIGNIFICANT CHANGE UP (ref 135–145)
GAS PNL BLDV: SIGNIFICANT CHANGE UP
GAS PNL BLDV: SIGNIFICANT CHANGE UP
GLUCOSE BLDV-MCNC: 87 MG/DL — SIGNIFICANT CHANGE UP (ref 70–99)
GLUCOSE SERPL-MCNC: 88 MG/DL — SIGNIFICANT CHANGE UP (ref 70–99)
HCO3 BLDV-SCNC: 26 MMOL/L — SIGNIFICANT CHANGE UP (ref 21–29)
HCT VFR BLD CALC: 41.3 % — SIGNIFICANT CHANGE UP (ref 34.5–45)
HCT VFR BLDA CALC: 40 % — SIGNIFICANT CHANGE UP (ref 39–50)
HGB BLD CALC-MCNC: 13 G/DL — SIGNIFICANT CHANGE UP (ref 11.5–15.5)
HGB BLD-MCNC: 13.3 G/DL — SIGNIFICANT CHANGE UP (ref 11.5–15.5)
LACTATE BLDV-MCNC: 1.2 MMOL/L — SIGNIFICANT CHANGE UP (ref 0.7–2)
LYMPHOCYTES # BLD AUTO: 2.2 K/UL — SIGNIFICANT CHANGE UP (ref 1–3.3)
LYMPHOCYTES # BLD AUTO: 24.2 % — SIGNIFICANT CHANGE UP (ref 13–44)
MCHC RBC-ENTMCNC: 29.6 PG — SIGNIFICANT CHANGE UP (ref 27–34)
MCHC RBC-ENTMCNC: 32.1 GM/DL — SIGNIFICANT CHANGE UP (ref 32–36)
MCV RBC AUTO: 92 FL — SIGNIFICANT CHANGE UP (ref 80–100)
MONOCYTES # BLD AUTO: 0.7 K/UL — SIGNIFICANT CHANGE UP (ref 0–0.9)
MONOCYTES NFR BLD AUTO: 8.4 % — SIGNIFICANT CHANGE UP (ref 2–14)
NEUTROPHILS # BLD AUTO: 5.6 K/UL — SIGNIFICANT CHANGE UP (ref 1.8–7.4)
NEUTROPHILS NFR BLD AUTO: 63.4 % — SIGNIFICANT CHANGE UP (ref 43–77)
NT-PROBNP SERPL-SCNC: 409 PG/ML — HIGH (ref 0–300)
PCO2 BLDV: 48 MMHG — SIGNIFICANT CHANGE UP (ref 35–50)
PH BLDV: 7.35 — SIGNIFICANT CHANGE UP (ref 7.35–7.45)
PLATELET # BLD AUTO: 207 K/UL — SIGNIFICANT CHANGE UP (ref 150–400)
PO2 BLDV: 36 MMHG — SIGNIFICANT CHANGE UP (ref 25–45)
POTASSIUM BLDV-SCNC: 4.1 MMOL/L — SIGNIFICANT CHANGE UP (ref 3.5–5.3)
POTASSIUM SERPL-MCNC: 4.7 MMOL/L — SIGNIFICANT CHANGE UP (ref 3.5–5.3)
POTASSIUM SERPL-SCNC: 4.7 MMOL/L — SIGNIFICANT CHANGE UP (ref 3.5–5.3)
PROCALCITONIN SERPL-MCNC: 0.03 NG/ML — SIGNIFICANT CHANGE UP (ref 0.02–0.1)
PROT SERPL-MCNC: 7.3 G/DL — SIGNIFICANT CHANGE UP (ref 6–8.3)
RBC # BLD: 4.48 M/UL — SIGNIFICANT CHANGE UP (ref 3.8–5.2)
RBC # FLD: 12.4 % — SIGNIFICANT CHANGE UP (ref 10.3–14.5)
SAO2 % BLDV: 62 % — LOW (ref 67–88)
SODIUM SERPL-SCNC: 138 MMOL/L — SIGNIFICANT CHANGE UP (ref 135–145)
WBC # BLD: 8.9 K/UL — SIGNIFICANT CHANGE UP (ref 3.8–10.5)
WBC # FLD AUTO: 8.9 K/UL — SIGNIFICANT CHANGE UP (ref 3.8–10.5)

## 2019-07-21 PROCEDURE — 83880 ASSAY OF NATRIURETIC PEPTIDE: CPT

## 2019-07-21 PROCEDURE — 85027 COMPLETE CBC AUTOMATED: CPT

## 2019-07-21 PROCEDURE — 84145 PROCALCITONIN (PCT): CPT

## 2019-07-21 PROCEDURE — 82947 ASSAY GLUCOSE BLOOD QUANT: CPT

## 2019-07-21 PROCEDURE — 85652 RBC SED RATE AUTOMATED: CPT

## 2019-07-21 PROCEDURE — 84132 ASSAY OF SERUM POTASSIUM: CPT

## 2019-07-21 PROCEDURE — 96374 THER/PROPH/DIAG INJ IV PUSH: CPT

## 2019-07-21 PROCEDURE — 82435 ASSAY OF BLOOD CHLORIDE: CPT

## 2019-07-21 PROCEDURE — 82330 ASSAY OF CALCIUM: CPT

## 2019-07-21 PROCEDURE — 84295 ASSAY OF SERUM SODIUM: CPT

## 2019-07-21 PROCEDURE — 99284 EMERGENCY DEPT VISIT MOD MDM: CPT | Mod: 25

## 2019-07-21 PROCEDURE — 96375 TX/PRO/DX INJ NEW DRUG ADDON: CPT

## 2019-07-21 PROCEDURE — 85014 HEMATOCRIT: CPT

## 2019-07-21 PROCEDURE — 87040 BLOOD CULTURE FOR BACTERIA: CPT

## 2019-07-21 PROCEDURE — 99284 EMERGENCY DEPT VISIT MOD MDM: CPT | Mod: GC

## 2019-07-21 PROCEDURE — 83605 ASSAY OF LACTIC ACID: CPT

## 2019-07-21 PROCEDURE — 80053 COMPREHEN METABOLIC PANEL: CPT

## 2019-07-21 PROCEDURE — 82803 BLOOD GASES ANY COMBINATION: CPT

## 2019-07-21 RX ORDER — FUROSEMIDE 40 MG
20 TABLET ORAL ONCE
Refills: 0 | Status: COMPLETED | OUTPATIENT
Start: 2019-07-21 | End: 2019-07-21

## 2019-07-21 RX ORDER — VANCOMYCIN HCL 1 G
1000 VIAL (EA) INTRAVENOUS ONCE
Refills: 0 | Status: COMPLETED | OUTPATIENT
Start: 2019-07-21 | End: 2019-07-21

## 2019-07-21 RX ADMIN — Medication 125 MILLIGRAM(S): at 19:19

## 2019-07-21 RX ADMIN — Medication 1 APPLICATION(S): at 20:32

## 2019-07-21 RX ADMIN — Medication 20 MILLIGRAM(S): at 19:19

## 2019-07-21 RX ADMIN — Medication 250 MILLIGRAM(S): at 19:19

## 2019-07-21 NOTE — ED PROVIDER NOTE - PROGRESS NOTE DETAILS
Abhishek CAROLINA: Patient's outpatient MRI reviewed; shows signs of tendonitis.  Patient ambulatory in the ER.  Discussed findings with patient and family.  Do not want further imaging at this time. Dr. Mckeon Note: pt could either have partially treated cellulitis versus skin changes related to tendinosis of peroneus seen on MRI.  Given MRI is negative for  bone changes and no abscess or obvious cellulitis on imaging, would consider more inflammatory response with skin changes.  Pt d/c antibx now, will observe outpt the patient off antibiotics and start topical steroids.  BC pending and pt agrees with plan to return to ER if either blood culture positive or rash worsens.

## 2019-07-21 NOTE — ED PROVIDER NOTE - NSFOLLOWUPINSTRUCTIONS_ED_ALL_ED_FT
1. Apply steroid cream to rash twice a day.  2. Return to ER if you either have a positive blood culture (we will call you for this) or your rash significantly worsens.  3. If no improvement, follow up with PCP for further treatment options.

## 2019-07-21 NOTE — ED PROVIDER NOTE - CARE PLAN
Principal Discharge DX:	Cellulitis of leg, right  Secondary Diagnosis:	Leg swelling Principal Discharge DX:	Tendinosis  Goal:	resolving  Secondary Diagnosis:	Leg swelling

## 2019-07-21 NOTE — ED PROVIDER NOTE - SKIN, MLM
Skin normal color for race, warm, dry and intact. +erythema with warmth with irregular borders c/w mild cellulitis of RLE medial side

## 2019-07-21 NOTE — ED ADULT NURSE NOTE - OBJECTIVE STATEMENT
Patient is a 64 y female presenting to the ED ambulatory from home with c/o right leg cellulitis. Patient A&Ox4. Patient reports having tendonitis in the right lower extremity several weeks ago. This led to burning, discoloration, and redness of the distal right lower extremity around the ankle. Patient was seen at [facility] 3 weeks ago and was diagnosed with cellulitis and placed on antibiotics. Patient finished the antibiotics today but the discomfort and rash continued in the right leg prompting patient to come to ED. Upon arrival to main ED, patient displays discoloration around the right ankle. Right ankle is painful upon palpation. Peripheral pulses present in right lower extremity. Gross neuro intact. Heart sounds heard upon auscultation. Lung sounds clear bilaterally. Patient denies chest pain, SOB, fever/chills, N/V/D, burning upon urination or difficulty urinating. PMH of HTN, aortic stenosis, asthma. Patient's family at bedside. Patient in no acute distress. Will continue to monitor.

## 2019-07-21 NOTE — ED PROVIDER NOTE - OBJECTIVE STATEMENT
Dr. Mckeon Note: 64F here with daughter presents with persistent but improved rash to RLE medial side s/p antibiotics currently on Doxycycline  (was on clindamycin when seen here 3wks ago), assoc with malaise.  No fever, vomiting, cp, sob, ab pain.  +swelling to RLE, ongoing, nonprogressive with known tendonitis.  Recently had MRI of foot as well.  Here for concern that infection is persisting and for need of IV antibiotics.

## 2019-07-21 NOTE — ED PROVIDER NOTE - CLINICAL SUMMARY MEDICAL DECISION MAKING FREE TEXT BOX
Dr. Mckeon Note: persistent rash of RLE despite antibiotics, check for systemic inflammatory signs, r/o DVt, r/o osteo, consider inpt vs outpt treatment for possible failure of outpatient treatment.

## 2019-07-26 LAB
CULTURE RESULTS: SIGNIFICANT CHANGE UP
SPECIMEN SOURCE: SIGNIFICANT CHANGE UP

## 2019-09-04 ENCOUNTER — APPOINTMENT (OUTPATIENT)
Dept: PULMONOLOGY | Facility: CLINIC | Age: 64
End: 2019-09-04

## 2019-10-16 ENCOUNTER — APPOINTMENT (OUTPATIENT)
Dept: PULMONOLOGY | Facility: CLINIC | Age: 64
End: 2019-10-16
Payer: MEDICARE

## 2019-10-16 VITALS
BODY MASS INDEX: 48.82 KG/M2 | WEIGHT: 293 LBS | OXYGEN SATURATION: 96 % | DIASTOLIC BLOOD PRESSURE: 78 MMHG | SYSTOLIC BLOOD PRESSURE: 124 MMHG | HEIGHT: 65 IN | HEART RATE: 90 BPM | RESPIRATION RATE: 16 BRPM

## 2019-10-16 PROCEDURE — 99214 OFFICE O/P EST MOD 30 MIN: CPT | Mod: 25

## 2019-10-16 PROCEDURE — ZZZZZ: CPT

## 2019-10-16 PROCEDURE — 94010 BREATHING CAPACITY TEST: CPT

## 2019-10-16 PROCEDURE — 94729 DIFFUSING CAPACITY: CPT

## 2019-10-16 RX ORDER — DICLOFENAC SODIUM 50 MG/1
50 TABLET, DELAYED RELEASE ORAL
Qty: 60 | Refills: 0 | Status: ACTIVE | COMMUNITY
Start: 2019-09-26

## 2019-10-16 NOTE — ASSESSMENT
[FreeTextEntry1] : Ms. Grace is a 64 year old female with a history of aortic stenosis, obesity s/p lap band surgery placed and removed, HLD, HTN, breast cancer, JETT, COPD, asthma, and allergic rhinitis.She presents to the office today for a follow up visit. Her number one issue is foot tendon injury. \par \par problem 1: allergies/sinus\par -continue Atrovent 0.6% 1 sniff each nostril BID\par -Continue Astelin 0.15 2 sniffs BID\par \par problem 2: asthma\par -continue nebulizer with DuoNeb QID prn\par -Continue Advair 250 at 1 inhalation BID\par -continue Spiriva 1 inhalation QD\par -continue Singulair 10 mg QHS \par \par -Asthma is  believed to be caused by inherited (genetic) and environmental factor, but its exact cause is unknown. Asthma may be triggered by allergens, lung infections, or irritants in the air. Asthma triggers are different for each person\par -Inhaler technique reviewed as well as oral hygiene techniques reviewed with patient. Avoidance of cold air, extremes of temperature, rescue inhaler should be used before exercise. Order of medication reviewed with patient. Recommended use of a cool mist humidifier in the bedroom.\par \par problem 3: lung cancer screening- Stable \par -f/u chest CT 6/2020\par \par problem 4: obesity \par -Weight loss, exercise, and diet control were discussed and are highly encouraged. Treatment options were given such as, aqua therapy, and contacting a nutritionist. Recommended to use the elliptical, stationary bike, less use of treadmill.  Obesity is associated with worsening asthma, shortness of breath, and potential for cardiac disease, diabetes, and other underlying medical conditions.\par \par problem 5: JETT\par -continue to use the CPAP machine, tolerating it well, and seeing the benefits\par -Patient is using her CPAP consistently\par \par -Sleep apnea is associated with adverse clinical consequences which an affect most organ systems.  Cardiovascular disease risk includes arrhythmias, atrial fibrillation, hypertension, coronary artery disease, and stroke. Metabolic disorders include diabetes type 2, non-alcoholic fatty liver disease. Mood disorder especially depression; and cognitive decline especially in the elderly. Associations with  chronic reflux/Arauz’s esophagus some but not all inclusive. \par -Reasons to assess this include arousal consistent with hypopnea; respiratory events most prominent in REM sleep or supine position; therefore sleep staging and body position are important for accurate diagnosis and estimation of AHI.\par \par problem 7: cardiac disease\par -f/u with Dr. Goldberg\par \par Problem 8: NM disease "foot"\par - Continue Lyrica 50 mg QD\par - S/p thoracic MRI without contrast: T9-T10 herniation- U/P "shots"\par - Recommended to use the P3 cream\par \par problem 8: health maintenance\par - She receive an influenza vaccine 2018\par -recommended strep pneumonia vaccines: Prevnar-13 vaccine, followed by Pneumo vaccine 23 on year following\par -recommended early intervention for URIs\par -recommended osteoporosis evaluations\par -recommended early dermatological evaluations\par -recommended after the age of 50 to the age of 70, colonoscopy every 5 years\par -encouraged early intervention\par \par F/U in 4 months\par She is encouraged to call with any changes, concerns, or questions

## 2019-10-16 NOTE — PHYSICAL EXAM

## 2019-10-16 NOTE — ADDENDUM
[FreeTextEntry1] : Documented by Elliott Brown acting as a scribe for Dr. Virgilio Marie on 10/16/2019.\par \par All medical record entries made by the Scribe were at my, Dr. Virgilio Marie's, direction and personally dictated by me on 10/16/2019. I have reviewed the chart and agree that the record accurately reflects my personal performance of the history, physical exam, assessment and plan. I have also personally directed, reviewed, and agree with the discharge instructions.

## 2019-10-16 NOTE — PROCEDURE
[FreeTextEntry1] : PFT with SPI and DLCO revealed mild obstructive dysfunction, with a FEV1 of 1.85L, which is 76% of predicted, and a moderately reduced diffusion of 14.8, which is 49% of predicted, with a normal flow volume loop \par \par Chest CT (7.12.19) reveals several stable nodules measure less than or up to 4 mm unchanged since November 2016. This is a benign feature. Follow up could be determined as felt indicated in view of the patient's history.\par Small hiatal hernia.\par Calcification at the aortic valve. The valve could be more sensitively assessed by echocardiogram.\par Coronary calcifications.\par Prominent caliber main pulmonary artery which may be associated with pulmonary artery hypertension.

## 2019-10-16 NOTE — REVIEW OF SYSTEMS
[Negative] : Sleep Disorder [Recent Wt Gain (___ Lbs)] : recent [unfilled] ~Ulb weight gain [Wheezing] : wheezing [Nasal Discharge] : nasal discharge [As Noted in HPI] : as noted in HPI [Trauma] : ~T physical trauma [Kyphoscoliosis] : kyphoscoliosis [Myalgias] : myalgias [Arthralgias] : arthralgias [Nasal Congestion] : no nasal congestion [Postnasal Drip] : no postnasal drip [Sinus Problems] : no sinus problems [Cough] : no cough [Dyspnea] : no dyspnea

## 2020-03-09 ENCOUNTER — APPOINTMENT (OUTPATIENT)
Dept: PULMONOLOGY | Facility: CLINIC | Age: 65
End: 2020-03-09

## 2020-07-02 NOTE — ED ADULT TRIAGE NOTE - WEIGHT IN LBS
600 cc urine noted in bedside drainage bag. pt switched to leg bag. pt demonstrates leg bag use by return demonstration. instructions reviewed. verbalized understanding
315.9

## 2020-08-24 ENCOUNTER — APPOINTMENT (OUTPATIENT)
Dept: PULMONOLOGY | Facility: CLINIC | Age: 65
End: 2020-08-24

## 2020-09-30 NOTE — ED ADULT TRIAGE NOTE - RESPIRATORY RATE (BREATHS/MIN)
Physical Therapy Daily Progress Note        Patient: Rcio Calhoun   : 1958  Diagnosis/ICD-10 Code:  Cerebrovascular accident (CVA), unspecified mechanism (CMS/HCC) [I63.9]  Referring practitioner: Vimal Matos MD  Date of Initial Visit: Type: THERAPY  Noted: 2020  Today's Date: 2020  Patient seen for 2 sessions           Subjective   Rico Calhoun reports: has returned to painting yet, but plans to try it this weekend.  Body fatigues easily.    Objective   GAIT: minimal right arm swing, left is normal.  Maybe slight inversion of the right foot during swing through.  OBSERVATION: has to be very intentional with right UE when doing fine motor activities.    See Exercise, Manual, and Modality Logs for complete treatment.       Assessment/Plan  S/P CVA with gait/balance and right UE fine motor deficits.  Gait looks some better today.    Progress per Plan of Care and Progress strengthening /stabilization /functional activity           Manual Therapy:         mins  71941;  Therapeutic Exercise:    42     mins  60932;     Neuromuscular Shanae:        mins  62301;    Therapeutic Activity:          mins  57127;     Gait Training:           mins  09083;     Ultrasound:          mins  57859;    Electrical Stimulation:         mins  93385 ( );  Iontophoresis          mins 94474   Traction          mins  98325  Fluidotherapy          mins  98647  Dry Needling          mins self-pay  Paraffin          mins  07775    Timed Treatment:   42   mins   Total Treatment:     42   mins    Lucas Escamilla PT  Physical Therapist   19

## 2020-12-17 NOTE — ED ADULT NURSE NOTE - PAIN: BODY LOCATION
Tc to patient yesterday to schedule her for follow up w/ Dr Bishop  Patient states she moved to [place] and requested to be seen closer to home   With Dr Mccullough since he was her doctor    TC today to Dr Herbert office   Spoke w/ Angela  Advised her pt is requesting to be seen by Dr Herbert since he is her physician and she lives closer to their facility  Angela stated she would discuss scheduling an appointment for patient with nurse and then contact her directly to schedule appointment   
leg/Right:

## 2021-07-27 ENCOUNTER — APPOINTMENT (OUTPATIENT)
Dept: CARDIOTHORACIC SURGERY | Facility: CLINIC | Age: 66
End: 2021-07-27

## 2021-09-10 ENCOUNTER — APPOINTMENT (OUTPATIENT)
Dept: PULMONOLOGY | Facility: CLINIC | Age: 66
End: 2021-09-10
Payer: MEDICARE

## 2021-09-10 VITALS
HEIGHT: 65 IN | DIASTOLIC BLOOD PRESSURE: 65 MMHG | WEIGHT: 293 LBS | RESPIRATION RATE: 16 BRPM | HEART RATE: 90 BPM | SYSTOLIC BLOOD PRESSURE: 120 MMHG | BODY MASS INDEX: 48.82 KG/M2 | OXYGEN SATURATION: 98 % | TEMPERATURE: 96.9 F

## 2021-09-10 DIAGNOSIS — J45.909 UNSPECIFIED ASTHMA, UNCOMPLICATED: ICD-10-CM

## 2021-09-10 PROCEDURE — 94618 PULMONARY STRESS TESTING: CPT

## 2021-09-10 PROCEDURE — 99214 OFFICE O/P EST MOD 30 MIN: CPT | Mod: 25

## 2021-09-10 NOTE — ASSESSMENT
[FreeTextEntry1] : Ms. Grace is a 66 year old female with a history of aortic stenosis,s/p TAVR obesity s/p lap band surgery placed and removed, HLD, HTN, breast cancer, JETT, COPD, asthma, and allergic rhinitis.She presents to the office today for a follow up visit. Her number one issue is foot tendon injury. ; recovery from TAVR \par \par problem 1: allergies/sinus\par -continue Atrovent 0.6% 1 sniff each nostril BID\par -Continue Astelin 0.15 2 sniffs BID\par \par problem 2: asthma/COPD (Stable) \par -continue nebulizer with DuoNeb QID prn\par -Continue Advair 250 at 1 inhalation BID\par -off of Spiriva 1 inhalation QD at the given time \par -off of Singulair 10 mg QHS at the given time \par -Asthma is  believed to be caused by inherited (genetic) and environmental factor, but its exact cause is unknown. Asthma may be triggered by allergens, lung infections, or irritants in the air. Asthma triggers are different for each person\par -Inhaler technique reviewed as well as oral hygiene techniques reviewed with patient. Avoidance of cold air, extremes of temperature, rescue inhaler should be used before exercise. Order of medication reviewed with patient. Recommended use of a cool mist humidifier in the bedroom.\par \par problem 3: lung cancer screening- Stable \par -f/u chest CT 9/2022 if the current CT scan is stable \par \par problem 4: obesity \par -Recommend Muniq Supplement \par -Weight loss, exercise, and diet control were discussed and are highly encouraged. Treatment options were given such as, aqua therapy, and contacting a nutritionist. Recommended to use the elliptical, stationary bike, less use of treadmill.  Obesity is associated with worsening asthma, shortness of breath, and potential for cardiac disease, diabetes, and other underlying medical conditions.\par \par problem 5: JETT\par -complete/repeat home sleep study \par -continue to use the CPAP machine, tolerating it well, and seeing the benefits\par -Patient is using her CPAP consistently "dreamstation" from [employer] \par \par -Sleep apnea is associated with adverse clinical consequences which an affect most organ systems.  Cardiovascular disease risk includes arrhythmias, atrial fibrillation, hypertension, coronary artery disease, and stroke. Metabolic disorders include diabetes type 2, non-alcoholic fatty liver disease. Mood disorder especially depression; and cognitive decline especially in the elderly. Associations with  chronic reflux/Arauz’s esophagus some but not all inclusive. \par -Reasons to assess this include arousal consistent with hypopnea; respiratory events most prominent in REM sleep or supine position; therefore sleep staging and body position are important for accurate diagnosis and estimation of AHI.\par \par problem 7: cardiac disease\par -f/u with Dr. Goldberg/Jeremías \par -s/p TAVR/ Stent \par \par Problem 8: NM disease "foot"\par - Continue Lyrica 50 mg QD\par - S/p thoracic MRI without contrast: T9-T10 herniation- U/P "shots"\par - Recommended to use the P3 cream\par \par problem 8: health maintenance\par - She receive an influenza vaccine 2020\par -recommended strep pneumonia vaccines: Prevnar-13 vaccine, followed by Pneumo vaccine 23 on year following\par -recommended early intervention for URIs\par -recommended osteoporosis evaluations\par -recommended early dermatological evaluations\par -recommended after the age of 50 to the age of 70, colonoscopy every 5 years\par -encouraged early intervention\par \par F/U in 4 months\par She is encouraged to call with any changes, concerns, or questions

## 2021-09-10 NOTE — ADDENDUM
[FreeTextEntry1] : Documented by Raine Linda acting as a scribe for Dr. Virgilio Marie on (09/10/2021).\par \par All medical record entries made by the Scribe were at my, Dr. Virgilio Marie's, direction and personally dictated by me on (09/10/2021). I have reviewed the chart and agree that the record accurately reflects my personal performance of the history, physical exam, assessment and plan. I have also personally directed, reviewed, and agree with the discharge instructions.\par

## 2021-09-10 NOTE — PHYSICAL EXAM
[No Acute Distress] : no acute distress [Normal Oropharynx] : normal oropharynx [III] : Mallampati Class: III [Normal Appearance] : normal appearance [No Neck Mass] : no neck mass [Normal Rate/Rhythm] : normal rate/rhythm [Normal S1, S2] : normal s1, s2 [No Murmurs] : no murmurs [No Resp Distress] : no resp distress [Clear to Auscultation Bilaterally] : clear to auscultation bilaterally [No Abnormalities] : no abnormalities [Benign] : benign [Normal Gait] : normal gait [No Clubbing] : no clubbing [No Cyanosis] : no cyanosis [No Edema] : no edema [FROM] : FROM [Normal Color/ Pigmentation] : normal color/ pigmentation [No Focal Deficits] : no focal deficits [Oriented x3] : oriented x3 [Normal Affect] : normal affect [TextBox_2] : obese  [TextBox_68] : I:E 1:3; Clear

## 2021-09-10 NOTE — PROCEDURE
[FreeTextEntry1] : CT Scan (9.17.2021) shows no nodule or infiltrate at this time \par \par 6 minute walk test reveals a low saturation of 91% with somewhat severe evidence of dyspnea or fatigue; walked 244.5 meters\par  \par \par

## 2021-09-10 NOTE — REVIEW OF SYSTEMS
[Recent Wt Gain (___ Lbs)] : recent [unfilled] ~Ulb weight gain [Wheezing] : wheezing [Nasal Discharge] : nasal discharge [As Noted in HPI] : as noted in HPI [Trauma] : ~T physical trauma [Kyphoscoliosis] : kyphoscoliosis [Myalgias] : myalgias [Arthralgias] : arthralgias [Negative] : Sleep Disorder [Nasal Congestion] : no nasal congestion [Postnasal Drip] : no postnasal drip [Sinus Problems] : no sinus problems [Cough] : no cough [Dyspnea] : no dyspnea

## 2022-01-14 ENCOUNTER — APPOINTMENT (OUTPATIENT)
Dept: PULMONOLOGY | Facility: CLINIC | Age: 67
End: 2022-01-14

## 2022-02-03 ENCOUNTER — APPOINTMENT (OUTPATIENT)
Dept: PULMONOLOGY | Facility: CLINIC | Age: 67
End: 2022-02-03
Payer: MEDICARE

## 2022-02-03 PROCEDURE — 99446 NTRPROF PH1/NTRNET/EHR 5-10: CPT

## 2022-06-06 ENCOUNTER — NON-APPOINTMENT (OUTPATIENT)
Age: 67
End: 2022-06-06

## 2022-07-07 ENCOUNTER — NON-APPOINTMENT (OUTPATIENT)
Age: 67
End: 2022-07-07

## 2022-11-08 ENCOUNTER — NON-APPOINTMENT (OUTPATIENT)
Age: 67
End: 2022-11-08

## 2022-11-17 ENCOUNTER — EMERGENCY (EMERGENCY)
Facility: HOSPITAL | Age: 67
LOS: 1 days | Discharge: HOME | End: 2022-11-17
Attending: EMERGENCY MEDICINE
Payer: MEDICARE

## 2022-11-17 VITALS
TEMPERATURE: 98 F | RESPIRATION RATE: 17 BRPM | SYSTOLIC BLOOD PRESSURE: 129 MMHG | DIASTOLIC BLOOD PRESSURE: 83 MMHG | OXYGEN SATURATION: 99 % | HEART RATE: 60 BPM

## 2022-11-17 VITALS
RESPIRATION RATE: 16 BRPM | WEIGHT: 293 LBS | HEART RATE: 76 BPM | DIASTOLIC BLOOD PRESSURE: 70 MMHG | HEIGHT: 63 IN | OXYGEN SATURATION: 96 % | SYSTOLIC BLOOD PRESSURE: 160 MMHG | TEMPERATURE: 98 F

## 2022-11-17 DIAGNOSIS — Z96.652 PRESENCE OF LEFT ARTIFICIAL KNEE JOINT: Chronic | ICD-10-CM

## 2022-11-17 DIAGNOSIS — Z96.651 PRESENCE OF RIGHT ARTIFICIAL KNEE JOINT: Chronic | ICD-10-CM

## 2022-11-17 DIAGNOSIS — Z98.89 OTHER SPECIFIED POSTPROCEDURAL STATES: Chronic | ICD-10-CM

## 2022-11-17 LAB
ALBUMIN SERPL ELPH-MCNC: 3.8 G/DL — SIGNIFICANT CHANGE UP (ref 3.3–5)
ALP SERPL-CCNC: 57 U/L — SIGNIFICANT CHANGE UP (ref 40–120)
ALT FLD-CCNC: 19 U/L — SIGNIFICANT CHANGE UP (ref 10–45)
ANION GAP SERPL CALC-SCNC: 7 MMOL/L — SIGNIFICANT CHANGE UP (ref 5–17)
AST SERPL-CCNC: 21 U/L — SIGNIFICANT CHANGE UP (ref 10–40)
BASOPHILS # BLD AUTO: 0.06 K/UL — SIGNIFICANT CHANGE UP (ref 0–0.2)
BASOPHILS NFR BLD AUTO: 0.7 % — SIGNIFICANT CHANGE UP (ref 0–2)
BILIRUB SERPL-MCNC: 0.2 MG/DL — SIGNIFICANT CHANGE UP (ref 0.2–1.2)
BUN SERPL-MCNC: 22 MG/DL — SIGNIFICANT CHANGE UP (ref 7–23)
CALCIUM SERPL-MCNC: 9.6 MG/DL — SIGNIFICANT CHANGE UP (ref 8.4–10.5)
CHLORIDE SERPL-SCNC: 104 MMOL/L — SIGNIFICANT CHANGE UP (ref 96–108)
CO2 SERPL-SCNC: 27 MMOL/L — SIGNIFICANT CHANGE UP (ref 22–31)
CREAT SERPL-MCNC: 0.55 MG/DL — SIGNIFICANT CHANGE UP (ref 0.5–1.3)
CRP SERPL-MCNC: 6 MG/L — HIGH (ref 0–4)
EGFR: 100 ML/MIN/1.73M2 — SIGNIFICANT CHANGE UP
EOSINOPHIL # BLD AUTO: 0.25 K/UL — SIGNIFICANT CHANGE UP (ref 0–0.5)
EOSINOPHIL NFR BLD AUTO: 2.9 % — SIGNIFICANT CHANGE UP (ref 0–6)
FLUAV AG NPH QL: SIGNIFICANT CHANGE UP
FLUBV AG NPH QL: SIGNIFICANT CHANGE UP
GLUCOSE SERPL-MCNC: 107 MG/DL — HIGH (ref 70–99)
HCT VFR BLD CALC: 41.6 % — SIGNIFICANT CHANGE UP (ref 34.5–45)
HGB BLD-MCNC: 13.3 G/DL — SIGNIFICANT CHANGE UP (ref 11.5–15.5)
IMM GRANULOCYTES NFR BLD AUTO: 0.5 % — SIGNIFICANT CHANGE UP (ref 0–0.9)
LYMPHOCYTES # BLD AUTO: 1.71 K/UL — SIGNIFICANT CHANGE UP (ref 1–3.3)
LYMPHOCYTES # BLD AUTO: 20 % — SIGNIFICANT CHANGE UP (ref 13–44)
MCHC RBC-ENTMCNC: 30.6 PG — SIGNIFICANT CHANGE UP (ref 27–34)
MCHC RBC-ENTMCNC: 32 GM/DL — SIGNIFICANT CHANGE UP (ref 32–36)
MCV RBC AUTO: 95.6 FL — SIGNIFICANT CHANGE UP (ref 80–100)
MONOCYTES # BLD AUTO: 0.68 K/UL — SIGNIFICANT CHANGE UP (ref 0–0.9)
MONOCYTES NFR BLD AUTO: 7.9 % — SIGNIFICANT CHANGE UP (ref 2–14)
NEUTROPHILS # BLD AUTO: 5.82 K/UL — SIGNIFICANT CHANGE UP (ref 1.8–7.4)
NEUTROPHILS NFR BLD AUTO: 68 % — SIGNIFICANT CHANGE UP (ref 43–77)
NRBC # BLD: 0 /100 WBCS — SIGNIFICANT CHANGE UP (ref 0–0)
PLATELET # BLD AUTO: 213 K/UL — SIGNIFICANT CHANGE UP (ref 150–400)
POTASSIUM SERPL-MCNC: 4.4 MMOL/L — SIGNIFICANT CHANGE UP (ref 3.5–5.3)
POTASSIUM SERPL-SCNC: 4.4 MMOL/L — SIGNIFICANT CHANGE UP (ref 3.5–5.3)
PROT SERPL-MCNC: 7.8 G/DL — SIGNIFICANT CHANGE UP (ref 6–8.3)
RBC # BLD: 4.35 M/UL — SIGNIFICANT CHANGE UP (ref 3.8–5.2)
RBC # FLD: 12.9 % — SIGNIFICANT CHANGE UP (ref 10.3–14.5)
RSV RNA NPH QL NAA+NON-PROBE: SIGNIFICANT CHANGE UP
SARS-COV-2 RNA SPEC QL NAA+PROBE: SIGNIFICANT CHANGE UP
SODIUM SERPL-SCNC: 138 MMOL/L — SIGNIFICANT CHANGE UP (ref 135–145)
WBC # BLD: 8.56 K/UL — SIGNIFICANT CHANGE UP (ref 3.8–10.5)
WBC # FLD AUTO: 8.56 K/UL — SIGNIFICANT CHANGE UP (ref 3.8–10.5)

## 2022-11-17 PROCEDURE — 86140 C-REACTIVE PROTEIN: CPT

## 2022-11-17 PROCEDURE — 73562 X-RAY EXAM OF KNEE 3: CPT

## 2022-11-17 PROCEDURE — 73562 X-RAY EXAM OF KNEE 3: CPT | Mod: 26,RT

## 2022-11-17 PROCEDURE — 87637 SARSCOV2&INF A&B&RSV AMP PRB: CPT

## 2022-11-17 PROCEDURE — 93971 EXTREMITY STUDY: CPT | Mod: 26,RT

## 2022-11-17 PROCEDURE — 73590 X-RAY EXAM OF LOWER LEG: CPT | Mod: 26,RT

## 2022-11-17 PROCEDURE — 73610 X-RAY EXAM OF ANKLE: CPT | Mod: 26,RT

## 2022-11-17 PROCEDURE — 99284 EMERGENCY DEPT VISIT MOD MDM: CPT | Mod: 25

## 2022-11-17 PROCEDURE — 73630 X-RAY EXAM OF FOOT: CPT

## 2022-11-17 PROCEDURE — 85652 RBC SED RATE AUTOMATED: CPT

## 2022-11-17 PROCEDURE — 99284 EMERGENCY DEPT VISIT MOD MDM: CPT | Mod: GC

## 2022-11-17 PROCEDURE — 80053 COMPREHEN METABOLIC PANEL: CPT

## 2022-11-17 PROCEDURE — 73590 X-RAY EXAM OF LOWER LEG: CPT

## 2022-11-17 PROCEDURE — 93971 EXTREMITY STUDY: CPT

## 2022-11-17 PROCEDURE — 36415 COLL VENOUS BLD VENIPUNCTURE: CPT

## 2022-11-17 PROCEDURE — 85025 COMPLETE CBC W/AUTO DIFF WBC: CPT

## 2022-11-17 PROCEDURE — 73630 X-RAY EXAM OF FOOT: CPT | Mod: 26,RT

## 2022-11-17 PROCEDURE — 73610 X-RAY EXAM OF ANKLE: CPT

## 2022-11-17 RX ORDER — LACTOBACILLUS ACIDOPHILUS 100MM CELL
1 CAPSULE ORAL
Qty: 20 | Refills: 0
Start: 2022-11-17 | End: 2022-11-26

## 2022-11-17 RX ORDER — LACTOBACILLUS ACIDOPHILUS 100MM CELL
1 CAPSULE ORAL ONCE
Refills: 0 | Status: COMPLETED | OUTPATIENT
Start: 2022-11-17 | End: 2022-11-17

## 2022-11-17 RX ADMIN — Medication 1 TABLET(S): at 22:48

## 2022-11-17 RX ADMIN — Medication 1 TABLET(S): at 22:49

## 2022-11-17 NOTE — ED PROVIDER NOTE - MUSCULOSKELETAL, MLM
Moderate swelling to right knee worse laterally, no bony ttp/deformity, ROM intact. Ambulatory with steady gait. Spine appears normal, range of motion is not limited, no muscle or joint tenderness

## 2022-11-17 NOTE — ED PROVIDER NOTE - PATIENT PORTAL LINK FT
You can access the FollowMyHealth Patient Portal offered by [facility] by registering at the following website: [email or web address]. By joining [facility]’s FollowMyHealth portal, you will also be able to view your health information using other applications (apps) compatible with our system.

## 2022-11-17 NOTE — ED PROVIDER NOTE - NSFOLLOWUPINSTRUCTIONS_ED_ALL_ED_FT
Please follow up with your primary care doctor within 1 week.  Please follow up with your orthopedic surgeon within 1 week    *Bring all printed lab/test results to your appointment(s).*    Take ibuprofen 400-600mg every 6 hrs as needed for pain. Take with food  Take acetaminophen 500-1000mg every 6 hrs as needed for pain. DO NOT EXCEED 4000mg DAILY.    Return to the ED for worsening pain, difficulty walking, numbness, tingling, weakness, or any other concerns. You will be contacted by our referral team to establish care with a new primary medical doctor.  Please follow up with your orthopedic surgeon within 1 week    *Bring all printed lab/test results to your appointment(s).*    Take ibuprofen 400-600mg every 6 hrs as needed for pain. Take with food  Take acetaminophen 500-1000mg every 6 hrs as needed for pain. DO NOT EXCEED 4000mg DAILY.  Take antibiotic Augmentin and probiotic as prescribed. (Please read all medication information/instructions).     Return to the ED for worsening pain, difficulty walking, numbness, tingling, weakness, or any other concerns.

## 2022-11-17 NOTE — ED PROVIDER NOTE - NSICDXPASTMEDICALHX_GEN_ALL_CORE_FT
PAST MEDICAL HISTORY:  Aortic stenosis dx 1-2 year ago    Arthritis     Asthma     Breakdown (mechanical) of GI prosth dev/grft, init lap band malfunction    Depression     Dysphagia, unspecified type     Malignant neoplasm of left female breast, unspecified site of breast 2015  radiation   surgery  oral medication    Morbid obesity, unspecified obesity type lap band 2009    Pulmonary emphysema, unspecified emphysema type     PVD (peripheral vascular disease)

## 2022-11-17 NOTE — ED PROVIDER NOTE - NSICDXPASTSURGICALHX_GEN_ALL_CORE_FT
PAST SURGICAL HISTORY:  C Section 1986 1988    H/O total knee replacement, left 2012    History of Cholecystectomy 1989    History of Hernia Repair umbilical  1993    S/P lumpectomy, left breast nodes x 4 removed   2015 breast cancer    S/P tendon repair quad  right  2010    Status post total right knee replacement 2010

## 2022-11-17 NOTE — ED PROVIDER NOTE - OBJECTIVE STATEMENT
67y F pmhx obesity, bilateral knee replacement, emphysema, breast cancer, aortic stenosis p/w redness to ankle and knee swelling. pt reports mechanical trip and fall 19 days ago on 10/30 onto her right knee, since then has had significant swelling with minimal improvement. ambulating without difficulty. pt also reports abrasions to R shin with redness, dx with cellulitis and completed 5-day course of keflex 500mg QID. Believes erythema is persistent but not worsening. Denies numbness, tingling, weakness, fever, chills, difficulty walking. Nonsmoker

## 2022-11-17 NOTE — ED PROVIDER NOTE - CLINICAL SUMMARY MEDICAL DECISION MAKING FREE TEXT BOX
BEV Sosa MD: 66 y/o female with obesity, b/l knee replacement, emphysema, AS, breast CA not on ctx p/w c/o RLE redness and swelling. Pt developed sx after a fall onto RLE on 10/30. Seen at , rx 5 day course of keflex. States sx improved, then recurred. Pt denies f/c. Denies being diabetic. Noticed some swelling to R knee, however has been able to ambulate on it. Orders performed in triage by [facility]: RLE venous duplex, XR R knee, basic labs. No acute abnormalities. Exam and story c/w cellulitis. Pt appears to have been tx with incomplete course for cellulitis. Pt is HDS, exam with a mild cellulitis. Offered pt outpt PO abx vs. CDU for IV abx, she preferred to try another course of PO abx - this time a full course. I feel that this is reasonable since her sx were improving on PO abx, however, they did not completely resolve, and sx are not too severe at this time. Return precautions discussed in detail with patient, and she expressed understanding

## 2022-11-17 NOTE — ED PROVIDER NOTE - RAPID ASSESSMENT
Pt w/ PMHx on cellulitis c/o persistent redness on R ankle and foot onset fall (sept 30th). Was on antibiotic Klaflax. Denies N/VF/C and previous blood clots.   Patient was rapidly assessed via a telemedicine and/or role of Quick Triage Doctor; a limited history, physical exam and assessment was performed. The patient will be seen and further evaluated in the main emergency department. The remainder of care and evaluation will be conducted by the primary emergency medicine team. Receiving team will follow up on labs, imaging and serially reassess patient as indicated. All further decisions regarding patient care, evaluation and disposition are at the discretion of the receiving primary emergency department team. Seen by Jaqui Buckley (PA) and accompanied by Chikis Grajeda (Adrienne) Pt w/ PMHx on cellulitis c/o persistent redness on R ankle and foot. Pt reports she had a fall and  reports developed redness and swelling to R ankle and foot around 11/7-10. She was on antibiotic Keflex and completed course. Denies N/VF/C and previous blood clots.     Patient was rapidly assessed via a telemedicine and/or role of Quick Triage Doctor; a limited history, physical exam and assessment was performed. The patient will be seen and further evaluated in the main emergency department. The remainder of care and evaluation will be conducted by the primary emergency medicine team. Receiving team will follow up on labs, imaging and serially reassess patient as indicated. All further decisions regarding patient care, evaluation and disposition are at the discretion of the receiving primary emergency department team. Seen by Jaqui Buckley (PA) and accompanied by Chikis Grajeda (Yudy)    Rapid assessment by Jaqui Buckley PA-C full eval to be performed in ED. Above documentation completed by yudy above. I was present for and agree with documentation.   Jaqui Buckley PA-C

## 2022-11-17 NOTE — ED PROVIDER NOTE - SKIN, MLM
Generalized erythema/warmth/tenderness to anterior right shin with poorly demarcated borders. No fluctuance, crepitus or deformity

## 2022-11-17 NOTE — ED PROVIDER NOTE - PROGRESS NOTE DETAILS
XR negative for acute pathology. ambulatory without difficulty. will DC with course of augmentin to continue/broaden coverage for cellulitis, recommend followup with her orthopedist, refer to new PMD. Will dc with follow up. Discussed plan and return precautions with patient who understands and agrees. All questions answered. - Edmodn Yoder PA-C

## 2022-11-17 NOTE — ED ADULT NURSE NOTE - OBJECTIVE STATEMENT
67 y.o F PMH PVD,  morbid obesity, pulmonary emphysema, arthritis, depression presenting to the ED for right lower leg swelling and redness that started around 10/30/22. Pt states that she suffered a fall on 10/30/22 due to a mechanical trip and fall and has noticed the symptoms since then. Pt was seen by outpt provider and took a 5 day course of Keflex but reports that the symptoms persist. Pt endorsing 4/10 pain upon arrival to [facility] ED, but states that the pain can be "unbearable" at home when ambulating. AOx3, MAEx4, respirations even and unlabored, skin warm dry and normal for race. Denies c/p, sob, n/v/d, dizziness, weakness. fevers, chills. Bed in lowest position, wheels locked, appropriate side rails up. Safety maintained, comfort provided.

## 2022-11-18 LAB — ERYTHROCYTE [SEDIMENTATION RATE] IN BLOOD: 55 MM/HR — HIGH (ref 0–20)

## 2022-12-19 NOTE — ED PROVIDER NOTE - NS ED ATTENDING STATEMENT MOD
I have personally seen and examined this patient.  I have fully participated in the care of this patient. I have reviewed all pertinent clinical information, including history, physical exam, plan and the Resident’s note and agree except as noted.
Performed

## 2023-01-23 NOTE — ED PROVIDER NOTE - DISPOSITION TYPE
[] [facility]) - [facility] & Therapy  [address]  [place]: [phone]   F: [phone]    Physical Therapy Spine Evaluation    Date:  2023  Patient: Tiffany Parham  : 1946  MRN: [id number]  Physician: Jenni Vann DO     Insurance: /[facility] supplement Medical necessity  Medical Diagnosis: M54.16 lumbar radiculopahy   Rehab Codes: low back pain M54.5  Onset Date: referral 23  Next 's appt. : 23 pain management,  family physician  Visit Count:    Cancel/No Show:     Subjective:   Patient presents to therapy with complaints of low back pain and radicular leg pain that prompted MD visit and referral to therapy on 23. Prior to PT, noted onset of symptoms back pain and (B) leg pain to the calf most distally (R) more than (L) starting about 3-4 months ago at the time of the therapy evaluation. Prior to this onset of symptoms, noted slight (R) sided numbness that started about 6-7 months ago from the time of the initial therapy evaluation. Patient saw family physician who then sent her to pain management doctor. Paitnet also MRI completed on 23 and results are below. XRAY below as well. These showed degenerative issues and disc issues with findings below. Patient currently notes complaints of increased pain and symptoms with first WB after sitting and first thing in the AM, notes increased symptoms with standing more than 5-10 minutes. Patient states that she avoids doing laundry or bending ADLs at this time. Was limited with ROM testing with clinical testing and peripheralized with flexion and centralized with prone extension, gave patient extension exercises for home. Has not tried additional stretching or heating pad at home.   CC: complaints of central back pain ~L4-L5; complaints of radicular symptoms into the (B) LEs to the calves most distally, (R) more than (L)  HPI: insidious onset about 7 months ago, increase about 3-4 months ago prompted MD referral dated 1/11/23    PMHx: [] Unremarkable [x] Diabetes [x] HTN  [] Pacemaker   [] MI/Heart Problems [] Cancer [x] Arthritis [] Other:              [x] Refer to full medical chart  In EPIC     Comorbidities:   [x] Obesity [] Dialysis  [] Other:   [] Asthma/COPD [] Dementia [] Other:   [] Stroke [x] Sleep apnea [] Other:   [] Vascular disease [] Rheumatic disease [] Other:     Tests: [x] X-Ray:12/13/22 [x] MRI: 1/13/23  [] Other:  Cervical spine: Grade 1 anterolisthesis C5 on C6. Degenerative and   degenerative disc changes. There left neural foraminal stenosis C3-C4. No   acute fracture, or prevertebral soft tissue swelling. Lumbar spine: Grade 1 retrolisthesis L3 on L4. Grade 1 anterolisthesis L1 on   L2. Advanced degenerative and degenerative disc changes with scoliotic   curvature without evidence of acute fracture. 12/13/22    L1-L2: Disc osteophyte complex. Ligamentum flavum thickening. Facet   hypertrophy. Mild spinal canal stenosis. Mild bilateral foraminal narrowing. L2-L3: Suspected prior laminectomy changes. Disc osteophyte complex. Facet   hypertrophy. No significant spinal canal stenosis. Mild/moderate right and   mild left foraminal narrowing. L3-L4: Disc osteophyte complex. Ligamentum flavum thickening. Facet   hypertrophy. Moderate spinal canal stenosis. Moderate right and mild left   foraminal narrowing. L4-L5: Disc osteophyte complex. Ligamentum thickening. Facet hypertrophy   with a left-sided 0.8 cm synovial cyst.  Severe spinal canal stenosis. Mild   right and moderate left foraminal narrowing. L5-S1: Disc bulge. Facet hypertrophy. No significant spinal canal stenosis. Moderate left foraminal narrowing.    1/13/23 MRI    Medications: [x] Refer to full medical record [] None [] Other:  Allergies:      [x] Refer to full medical record [] None [] Other:    Function:  Hand Dominance  [x] Right  [] Left  Patient lives with: In what type of home [x]  One story   [] Two story   [] Split level   Number of stairs to enter    With handrail on the []  Right to enter   [] Left to enter   Bathroom has a []  Tub only  [] Tub/shower combo   [] Walk in shower    []  Grab bars   Washing machine is on []  Main level   [] Second level   [x] Basement   Employer retired   Job Status []  Normal duty   [] Light duty   [] Off due to condition    [x]  Retired   [] Not employed   [] Disability  [] Other:  []  Return to work:    Work activities/duties      Previously (I) with all functional activity, currently limited as listed below   ADL/IADL Previous level of function Current level of function Who currently assists the patient with task   Bathing  [] Independent  [] Assist [] Independent  [] Assist    Dress/grooming [] Independent  [] Assist [] Independent  [] Assist    Transfer/mobility [] Independent  [] Assist [] Independent  [] Assist    Feeding [] Independent  [] Assist [] Independent  [] Assist    Toileting [] Independent  [] Assist [] Independent  [] Assist    Driving [] Independent  [] Assist [] Independent  [] Assist    Housekeeping [] Independent  [] Assist [] Independent  [x] Assist Difficulty, inability to do bending ALDs, avoids washing machine- makes son do it   Grocery shop/meal prep [] Independent  [] Assist [x] Independent  [] Assist  Limited with standing more than 5-10 minutes     Gait Prior level of function Current level of function    [] Independent  [] Assist [] Independent  [] Assist   Device: [] Independent [] Independent    [] Straight Cane [] Quad cane [] Straight Cane [] Quad cane    [] Standard walker [] Rolling walker   [] 4 wheeled walker [] Standard walker [] Rolling walker   [] 4 wheeled walker    [] Wheelchair [] Wheelchair     Pain:  [x] Yes  [] No Location: low back, into (B) LEs (R) more than (L) Pain Rating: (0-10 scale) 7/10  Pain altered Tx:  [] Yes  [] No  Action:    Symptoms:  [x] Improving [] Worsening [] Same  Better:  [] AM    [] PM    [] Sit    [] Rise/Sit    []Stand    [] Walk    [] Lying    [] Other:  Worse: [] AM    [] PM    [] Sit    [] Rise/Sit    []Stand    [] Walk    [] Lying    [] Bend                             [] Valsalva    [] Other:  Sleep: [] OK    [] Disturbed    Objective:  Flexion to feet  Extension 8 degrees with central back pain    SLR (R) (+) 65 degrees, (L) 70 degrees    Repeated movement testing:  Flexion in standing peripheralizes symptoms to the (R) calf, remains worse  TANA no effect  Prone lying centralizes symptoms to back  RAMEZ- abolishes symptoms, remains better    Patient given RAMEZ 3x/day 3 x 15\" hold to help with abolishing radicular symptoms    OBSERVATION No Deficit Deficit Not Tested Comments   Posture       Forward Head [] [] []    Rounded Shoulders [] [] []    Kyphosis [] [] []    Lordosis [] [] []    Lateral Shift [] [] []    Scoliosis [] [] []    Iliac Crest [] [] []    PSIS [] [] []    ASIS [] [] []    Genu Valgus [] [] []    Genu Varus [] [] []    Genu Recurvatum [] [] []    Pronation [] [] []    Supination [] [] []    Leg Length Discrp [] [] []    Slumped Sitting [] [] []    Palpation [] [] [] MOD TTP L3-L5   Sensation [] [] []    Edema [] [] []    Neurological [] [] []      FUNCTION Normal Difficult Unable   Sitting [x] [] []   Standing [] [x] []   Ambulation [] [x] []   Groom/Dress [] [x] []   Lift/Carry [] [x] []   Stairs [] [x] []   Bending [] [x] []   OH reach [] [] []   Sit to Stand [] [] []     Comments:                                  Rhonda Fall Risk Assessment    Risk Factor Scale  Score   History of Falls [] Yes  [] No 25  0 25   Secondary Diagnosis [] Yes  [] No 15  0 0   Ambulatory Aid [] Furniture  [] Crutches/cane/walker  [] None/bedrest/wheelchair/nurse 30  15  0 0   IV/Heparin Lock [] Yes  [] No 20  0 0   Gait/Transferring [] Impaired  [] Weak  [] Normal/bedrest/immobile 20  10  0 0   Mental Status [] Forgets limitations  [] Oriented to own ability 15  0 0      Total:25     Based on the Assessment score: check the appropriate box. []  No intervention needed   Low =   Score of 0-24    [x]  Use standard prevention interventions Moderate =  Score of 24-44   [x] Give patient handout and discuss fall prevention strategies   [x] Establish goal of education for patient/family RE: fall prevention strategies    []  Use high risk prevention interventions High = Score of 45 and higher   [] Give patient handout and discuss fall prevention strategies   [] Establish goal of education for patient/family Re: fall prevention strategies   [] Discuss lifeline / other resources    Electronically signed by:   Citlaly Ladd PT  Date: 1/23/2023       Assessment: Patient with limited lumbar mobility, limited with standing tolerance, limited with WB after sitting or in AM.  Symptoms are consistent with possible lumbar disc issue, which would be consistent with MRI findings. Patient would continue to benefit from skilled physical therapy services in order to: decrease or abolish radicular symptoms, improve postural awareness during activity. Problem list, as detailed above:   [] ? Back Pain: 7/10   [] ? ROM:  Limited with extension ROM at end range  [] ? Function: decreased tolerance of standing more than 5-10 minutes, limited with WB after sitting or in the AM     STG: (to be met in 8 treatments)  ? Pain: Improved pain levels 3-4/10 to improve general activity levels  ? ROM: extension to 10 degrees with less central back pain  ? Function: Improved standing tolerance 15-20 minutes, improved WB after sitting or in AM by 75% self report   Independent with Home Exercise Programs  Demonstrate Knowledge of fall prevention  LTG: (to be met in 12 treatments)  ? Pain: Improved pain levels 0-1/10 to improve general activity levels  ? ROM: extension to 15 degrees with less central back pain  ?  Function: Improved standing tolerance to more than 30 minutes to help with tolerance of meal prep, improved WB after sitting or in AM by 90% self report   Independent with Home Exercise Programs  Demonstrate Knowledge of fall prevention      Patient goals: improved general funcrtion      Functional Assessment Used: optimal testing   Current Status Score:9/3  50% limited for bending, lifting,carrying  Goal Status Sore: 5/3    Evaluation Complexity:  History (Personal factors, comorbidities) [] 0 [x] 1-2 [] 3+   Exam (limitations, restrictions) [] 1-2 [x] 3 [] 4+   Clinical presentation (progression) [] Stable [x] Evolving  [] Unstable   Decision Making [] Low [x] Moderate [] High    [] Low Complexity [x] Moderate Complexity [] High Complexity       Rehab Potential:  [] Good  [x] Fair  [] Poor   Suggested Professional Referral:  [x] No  [] Yes:  Barriers to Goal Achievement[de-identified]  [x] No  [] Yes:  Domestic Concerns:  [x] No  [] Yes:    Pt. Education:  [x] Plans/Goals, Risks/Benefits discussed  [x] Home exercise program  Method of Education: [x] Verbal  [] Demo  [x] Written  Comprehension of Education:  [x] Verbalizes understanding. [] Demonstrates understanding. [] Needs Review. [] Demonstrates/verbalizes understanding of HEP/Ed previously given.     Treatment Plan:  [x] Therapeutic Exercise   32787  [] Iontophoresis: 4 mg/mL Dexamethasone Sodium Phosphate  mAmin  15454   [] Therapeutic Activity  76932 [] Vasopneumatic cold with compression  13249    [] Gait Training   60178 [] Ultrasound   71093   [x] Neuromuscular Re-education  84825 [x] Electrical Stimulation Unattended  39601   [x] Manual Therapy  60234 [] Electrical Stimulation Attended  81746   [x] Instruction in HEP  [] Lumbar/Cervical Traction  93333   [] Aquatic Therapy   30323 [x] Cold/hotpack    [] Massage   56776      [] Dry Needling, 1 or 2 muscles  72852   [] Biofeedback, first 15 minutes   00075  [] Biofeedback, additional 15 minutes   37121 [] Dry Needling, 3 or more muscles  61343       []  Medication allergies reviewed for use of Dexamethasone Sodium Phosphate 4mg/ml     with iontophoresis treatments. Pt is not allergic. Frequency:  2-3 x/week for 12 visits    Todays Treatment:  Modalities:   Precautions:  Exercises:all exercises given below on 1/23/23 for HEP  Exercise Reps/ Time Weight/ Level Comments   Postural education/modification      RAMZE 3 x 20\"     Extension standing  10X  Difficulty/awkward and unable to get to full stretch               Other:    Specific Instructions for next treatment:    Treatment Charges: Mins Units   [x] Evaluation       []  Low       [x]  Moderate       []  High 20 1   []  Modalities     []  Ther Exercise 25 2   []  Manual Therapy     []  Ther Activities     []  Aquatics     []  Neuromuscular     []  Gait Training     []  Dry Needling           1-2 muscles     []  Dry Needling           3 or more muscles     [] Vasocompression     []  Other 45 3     TOTAL TREATMENT TIME: 45    Time in: 1100      Time out: 1150    Electronically signed by: Lizy Street PT        Physician Signature:________________________________Date:__________________  By signing above or cosigning this note, I have reviewed this plan of care and certify a need for medically necessary rehabilitation services.      *PLEASE SIGN ABOVE AND FAX BACK ALL PAGES* DISCHARGE

## 2023-01-25 ENCOUNTER — APPOINTMENT (OUTPATIENT)
Dept: ORTHOPEDIC SURGERY | Facility: CLINIC | Age: 68
End: 2023-01-25
Payer: MEDICARE

## 2023-01-25 VITALS — BODY MASS INDEX: 51.91 KG/M2 | HEIGHT: 63 IN | WEIGHT: 293 LBS

## 2023-01-25 DIAGNOSIS — M25.561 PAIN IN RIGHT KNEE: ICD-10-CM

## 2023-01-25 DIAGNOSIS — Z96.652 PRESENCE OF LEFT ARTIFICIAL KNEE JOINT: ICD-10-CM

## 2023-01-25 DIAGNOSIS — Z96.651 PRESENCE OF RIGHT ARTIFICIAL KNEE JOINT: ICD-10-CM

## 2023-01-25 DIAGNOSIS — S89.91XA UNSPECIFIED INJURY OF RIGHT LOWER LEG, INITIAL ENCOUNTER: ICD-10-CM

## 2023-01-25 PROCEDURE — 99214 OFFICE O/P EST MOD 30 MIN: CPT

## 2023-01-25 PROCEDURE — 73562 X-RAY EXAM OF KNEE 3: CPT | Mod: 50

## 2023-01-25 NOTE — PHYSICAL EXAM
[de-identified] : Constitutional:Well nourished , well developed and in no acute distress\par Psychiatric: Alert and oriented to time place and person.Appropriate affect \par Skin:Head, neck, arms and lower extremities:no lesions or discoloration\par HEENT: Normocephalic, EOM intact, Nasal septum midline,\par Respiratory: Unlabored respirations,no audible wheezing ,no tachypnea, no cyanosis\par Cardiovascular: no leg swelling  no ankle edema no JVD, pulse regular\par Vascular: no calf or thigh tenderness, \par Peripheral pulses; intact\par Lymphatics:No groin adenopathy,no lymphedema lower  or upper extremities\par Left knee no effusion flexion 0/115 ligaments intact\par Right knee tender lateral tibial plateau no effusion flexion 0/115 valgus laxity grade 2Healing abrasion over lateral tibial plateau [de-identified] : X-rays right and left knee 3 views demonstrates satisfactory appearance right and left total knee components right knee lateral patellar facet impingement lateral femoral condyle no evidence of fracture

## 2023-01-25 NOTE — DISCUSSION/SUMMARY
[de-identified] : Impression right and left total knee replacement\par , contusion Right knee rule out occult fracture rule out occult loosening ligamentous laxity\par Plan CAT scan right knee

## 2023-01-25 NOTE — HISTORY OF PRESENT ILLNESS
[de-identified] : 68y/o female presents for evaluation of bilateral knees. Patient has history of bilateral knee replacements with Dr. Ho. She reports she was doing well post replacements up until a few months ago after she fell. She states she fell 3x's; in October 2022, December 2022, and 2 weeks ago. The first 2 falls she experienced while pivoting and turning. The 3rd fall she experienced while walking over a curb. She reports pain in lateral right knee as a result and some anterior left knee pain. She has been doing PT for balance and gait. No recent x-rays. Patient after first fall patient developed cellulitis requiring antibiotic treatment.  Right knee pain is now constant aggravated by weightbearing\par

## 2023-02-07 ENCOUNTER — RX RENEWAL (OUTPATIENT)
Age: 68
End: 2023-02-07

## 2023-02-07 ENCOUNTER — APPOINTMENT (OUTPATIENT)
Dept: PULMONOLOGY | Facility: CLINIC | Age: 68
End: 2023-02-07
Payer: MEDICARE

## 2023-02-07 ENCOUNTER — NON-APPOINTMENT (OUTPATIENT)
Age: 68
End: 2023-02-07

## 2023-02-07 VITALS
RESPIRATION RATE: 16 BRPM | HEIGHT: 63 IN | WEIGHT: 293 LBS | DIASTOLIC BLOOD PRESSURE: 90 MMHG | TEMPERATURE: 96.3 F | SYSTOLIC BLOOD PRESSURE: 140 MMHG | OXYGEN SATURATION: 98 % | BODY MASS INDEX: 51.91 KG/M2 | HEART RATE: 82 BPM

## 2023-02-07 DIAGNOSIS — J43.8 OTHER EMPHYSEMA: ICD-10-CM

## 2023-02-07 DIAGNOSIS — J30.0 VASOMOTOR RHINITIS: ICD-10-CM

## 2023-02-07 DIAGNOSIS — R09.82 POSTNASAL DRIP: ICD-10-CM

## 2023-02-07 DIAGNOSIS — G47.33 OBSTRUCTIVE SLEEP APNEA (ADULT) (PEDIATRIC): ICD-10-CM

## 2023-02-07 DIAGNOSIS — R09.81 NASAL CONGESTION: ICD-10-CM

## 2023-02-07 PROCEDURE — 94010 BREATHING CAPACITY TEST: CPT

## 2023-02-07 PROCEDURE — 99214 OFFICE O/P EST MOD 30 MIN: CPT | Mod: 25

## 2023-02-07 RX ORDER — AZELASTINE HYDROCHLORIDE 137 UG/1
0.1 SPRAY, METERED NASAL TWICE DAILY
Qty: 1 | Refills: 1 | Status: ACTIVE | COMMUNITY
Start: 2023-02-07 | End: 1900-01-01

## 2023-02-07 NOTE — PHYSICAL EXAM
[No Acute Distress] : no acute distress [Normal Oropharynx] : normal oropharynx [Normal Appearance] : normal appearance [No Neck Mass] : no neck mass [Normal Rate/Rhythm] : normal rate/rhythm [Normal S1, S2] : normal s1, s2 [No Murmurs] : no murmurs [No Resp Distress] : no resp distress [Clear to Auscultation Bilaterally] : clear to auscultation bilaterally [No Abnormalities] : no abnormalities [Benign] : benign [Normal Gait] : normal gait [No Clubbing] : no clubbing [No Cyanosis] : no cyanosis [FROM] : FROM [Normal Color/ Pigmentation] : normal color/ pigmentation [No Focal Deficits] : no focal deficits [Oriented x3] : oriented x3 [Normal Affect] : normal affect [TextBox_68] : diminished at the bases

## 2023-02-07 NOTE — HISTORY OF PRESENT ILLNESS
[Obstructive Sleep Apnea] : obstructive sleep apnea [TextBox_4] : Patient is a 67 year old female with PMHx of asthma, allergic rhinitis, lung nodule, morbid obesity, and JETT. She presents today after to f/u regarding her CT results and with a persistent cold. \par \par She states that she feels she has been sick with a cold since October. She has 2 grandkids and one is starting  so there are illnesses going around her family. She says she feels she has nasal congestion and had a minor cough that has since resolved with mucinex use. She says she also has a runny nose. She denies fever, chills, fatigue. \par \par She states she has also been having some balance issues and has fallen 3 times since 10/2022. She saw a podiatrist who referred her for gait training and she is doing that currently. She isn't sure whether her balance issues are related to her sinuses so she wants to try treatment. She has not seen a ENT doctor.\par \par She currently takes advair bid for her asthma. She no longer takes spiriva or singulair. She doesn't take any medications for her sinuses. She has previously taken Azelesteine but it was  so she didn't want to take it. She felt the Azelesteine helped so she would like to use that again.\par \par She states since her last visit she had a repeat CT scan at [facility] 2022. \par \par Pt compliant with PAP use- has seen significant improvement in her sleep with use of the device. \par Cannot sleep without.

## 2023-02-07 NOTE — REVIEW OF SYSTEMS
[Sinus Problems] : sinus problems [Negative] : Endocrine [TextBox_94] : balance issues w/ recent falls

## 2023-02-07 NOTE — ASSESSMENT
[FreeTextEntry1] : Patient is a 67 year old female with PMHx including asthma, allergic rhinitis, lung nodule, morbid obesity, and JETT. She presents today for her sinus congestion. \par \par #1. Sinus Congestion/post nasal drip \par - Add Azelesteine 2 sprays in am and pm\par -Add Fluticasone 2 sprays in am\par - Continue medications until there is improvement and then can discontinue one at a time\par \par #2. Asthma-stable/controlled\par - Continue Advair 250 1 inhalation BID rinse and gargle\par - add Singulair 10 mg PO QHS\par \par #3.Vasomotor rhinitis\par -add Ipratropium Bromide 2 sprays each nostril 2-3 times PRN\par \par #4. Lung Cancer Screening/abnormal CT with emphysema\par -reviewed prior CT from 2022- stable\par - f/u CT scan due within one month, Rx given to patient\par - f/u with cardiology regarding calcifications on Mitral Valve found on CT in 1/2022- follows with Dr. Goldberg, reports he is aware\par \par #4. JETT\par -pt is compliant with use and benefitting from PAP therapy\par - Continue with CPAP machine at home\par \par Pt to follow up in 6 months with myself or Dr. Marie- full PFTS\par we will contact the pt with results of CT scan when available. \par 
25 feet

## 2023-02-10 ENCOUNTER — NON-APPOINTMENT (OUTPATIENT)
Age: 68
End: 2023-02-10

## 2023-04-24 ENCOUNTER — TRANSCRIPTION ENCOUNTER (OUTPATIENT)
Age: 68
End: 2023-04-24

## 2023-04-25 ENCOUNTER — TRANSCRIPTION ENCOUNTER (OUTPATIENT)
Age: 68
End: 2023-04-25

## 2023-05-08 ENCOUNTER — RX RENEWAL (OUTPATIENT)
Age: 68
End: 2023-05-08

## 2023-05-09 ENCOUNTER — NON-APPOINTMENT (OUTPATIENT)
Age: 68
End: 2023-05-09

## 2023-07-10 ENCOUNTER — RX RENEWAL (OUTPATIENT)
Age: 68
End: 2023-07-10

## 2023-08-15 NOTE — REASON FOR VISIT
V2.0  History and Physical      Name:  Mahamed Maya /Age/Sex: 1953  (69 y.o. male)   MRN & CSN:  [id number] & [id number] Encounter Date/Time: 2023 9:47 PM EDT   Location:  [address] [place] [id number] PCP: Billy Mora MD       Assessment and Plan:   Mahamed Maya is a 69 y.o. male with past medical history of Hodgkin's Lymphoma, COPD, Anxiety/depression who presents with worsening SOB. Acute Respiratory failure w/ Hypoxia   Secondary to COPD exacerbation and concern for pHTN  Doubt pneumonia based on clinical s/s, low inflammatory markers, no consolidation on chest imaging. CTA chest wet read, no focal consolidation, mild developing infiltrate R. Middle lobe, contrast extravasation in hepatic vasculature. Procalcitonin 0.16/Pro BNP 3850. VBG without acidosis or hypercapnia. No PFTs on chart  Admit to inpatient   Start IV solumedrol   Scheduled Duoneb  Levaquin given recurrent admission for COPD exacerbation and immune therapy  Check sputum Cx  COVID-19 and Flu screen  Optimize pre and afterload, will give x1 Lasix 20mg  Echocardiogram   Follow up on CTA chest  Supplemental O2 to target SPO2 88-92%  6MWT on discharge    Elevated Troponin  Likely secondary to underlying hypoxia  HS Trop 45   EKG SR, 95/min, no acute ST-T wave abnormalities, possible LAE  Repeat Troponin    Hypokalemia   3.3 on admission  Replace and monitor in am    Oral thrush  Nystatin suspension    Anxiety Disorder  Restart Klonopin  Continue celexa per recommendations from Psychiatry    Hodgkin's Lymphoma on Nivolumab  Continue outpatient follow up Oncology    Inpatient, Med surg with telemetry  Protonix for stress ulcer ppx  Full code    Disposition:   Current Living situation: Home w/ spouse  Expected Disposition: Home  Estimated D/C: 3 days    Diet ADULT DIET;  Regular   DVT Prophylaxis [x] Lovenox, []  Heparin, [] SCDs, [] Ambulation,  [] Eliquis, [] Xarelto, [] Coumadin   Code Status Full Code   Surrogate Decision Maker/ POA Nyasia Jenkins no [Acute] : an acute visit [FreeTextEntry1] : URI-induced asthmatic bronchitis

## 2023-08-22 ENCOUNTER — APPOINTMENT (OUTPATIENT)
Dept: PULMONOLOGY | Facility: CLINIC | Age: 68
End: 2023-08-22

## 2023-10-20 RX ORDER — IPRATROPIUM BROMIDE 42 UG/1
0.06 SPRAY NASAL 3 TIMES DAILY
Qty: 1 | Refills: 3 | Status: ACTIVE | COMMUNITY
Start: 2023-02-07 | End: 1900-01-01

## 2023-10-27 ENCOUNTER — APPOINTMENT (OUTPATIENT)
Dept: PULMONOLOGY | Facility: CLINIC | Age: 68
End: 2023-10-27
Payer: MEDICARE

## 2023-10-27 VITALS
HEIGHT: 63 IN | WEIGHT: 280 LBS | RESPIRATION RATE: 16 BRPM | HEART RATE: 89 BPM | BODY MASS INDEX: 49.61 KG/M2 | SYSTOLIC BLOOD PRESSURE: 142 MMHG | TEMPERATURE: 97.2 F | DIASTOLIC BLOOD PRESSURE: 88 MMHG | OXYGEN SATURATION: 97 %

## 2023-10-27 DIAGNOSIS — R91.8 OTHER NONSPECIFIC ABNORMAL FINDING OF LUNG FIELD: ICD-10-CM

## 2023-10-27 DIAGNOSIS — J02.9 ACUTE PHARYNGITIS, UNSPECIFIED: ICD-10-CM

## 2023-10-27 PROCEDURE — 99214 OFFICE O/P EST MOD 30 MIN: CPT

## 2023-10-27 RX ORDER — PREDNISONE 10 MG/1
10 TABLET ORAL
Qty: 50 | Refills: 0 | Status: DISCONTINUED | COMMUNITY
Start: 2019-05-30 | End: 2023-10-27

## 2023-10-27 RX ORDER — CLARITHROMYCIN 500 MG/1
500 TABLET, FILM COATED ORAL
Qty: 20 | Refills: 0 | Status: DISCONTINUED | COMMUNITY
Start: 2019-05-30 | End: 2023-10-27

## 2023-10-27 RX ORDER — PREDNISONE 10 MG/1
10 TABLET ORAL
Qty: 1 | Refills: 0 | Status: DISCONTINUED | COMMUNITY
Start: 2017-09-15 | End: 2023-10-27

## 2023-10-27 RX ORDER — HYDROCODONE BITARTRATE AND HOMATROPINE METHYLBROMIDE 5; 1.5 MG/5ML; MG/5ML
5-1.5 SOLUTION ORAL
Qty: 150 | Refills: 0 | Status: DISCONTINUED | COMMUNITY
Start: 2017-01-05 | End: 2023-10-27

## 2023-10-27 RX ORDER — PREDNISONE 10 MG/1
10 TABLET ORAL
Qty: 50 | Refills: 0 | Status: DISCONTINUED | COMMUNITY
Start: 2019-03-26 | End: 2023-10-27

## 2023-11-20 ENCOUNTER — RX RENEWAL (OUTPATIENT)
Age: 68
End: 2023-11-20

## 2024-01-10 ENCOUNTER — RX RENEWAL (OUTPATIENT)
Age: 69
End: 2024-01-10

## 2024-01-10 RX ORDER — FLUTICASONE PROPIONATE 50 UG/1
50 SPRAY, METERED NASAL TWICE DAILY
Qty: 48 | Refills: 0 | Status: ACTIVE | COMMUNITY
Start: 2023-02-07 | End: 1900-01-01

## 2024-04-15 ENCOUNTER — RX RENEWAL (OUTPATIENT)
Age: 69
End: 2024-04-15

## 2024-04-15 RX ORDER — MONTELUKAST 10 MG/1
10 TABLET, FILM COATED ORAL
Qty: 90 | Refills: 3 | Status: ACTIVE | COMMUNITY
Start: 2023-02-07 | End: 1900-01-01

## 2024-05-17 ENCOUNTER — APPOINTMENT (OUTPATIENT)
Dept: PULMONOLOGY | Facility: CLINIC | Age: 69
End: 2024-05-17
Payer: MEDICARE

## 2024-05-17 VITALS
HEIGHT: 63 IN | DIASTOLIC BLOOD PRESSURE: 70 MMHG | WEIGHT: 245 LBS | TEMPERATURE: 97.6 F | RESPIRATION RATE: 16 BRPM | HEART RATE: 77 BPM | SYSTOLIC BLOOD PRESSURE: 140 MMHG | OXYGEN SATURATION: 95 % | BODY MASS INDEX: 43.41 KG/M2

## 2024-05-17 DIAGNOSIS — Z86.19 PERSONAL HISTORY OF OTHER INFECTIOUS AND PARASITIC DISEASES: ICD-10-CM

## 2024-05-17 DIAGNOSIS — J30.9 ALLERGIC RHINITIS, UNSPECIFIED: ICD-10-CM

## 2024-05-17 DIAGNOSIS — J45.909 UNSPECIFIED ASTHMA, UNCOMPLICATED: ICD-10-CM

## 2024-05-17 DIAGNOSIS — E66.01 MORBID (SEVERE) OBESITY DUE TO EXCESS CALORIES: ICD-10-CM

## 2024-05-17 DIAGNOSIS — R06.02 SHORTNESS OF BREATH: ICD-10-CM

## 2024-05-17 DIAGNOSIS — G47.33 OBSTRUCTIVE SLEEP APNEA (ADULT) (PEDIATRIC): ICD-10-CM

## 2024-05-17 DIAGNOSIS — R93.89 ABNORMAL FINDINGS ON DIAGNOSTIC IMAGING OF OTHER SPECIFIED BODY STRUCTURES: ICD-10-CM

## 2024-05-17 PROCEDURE — 99214 OFFICE O/P EST MOD 30 MIN: CPT | Mod: 25

## 2024-05-17 PROCEDURE — 95012 NITRIC OXIDE EXP GAS DETER: CPT

## 2024-05-17 PROCEDURE — 94010 BREATHING CAPACITY TEST: CPT

## 2024-05-17 NOTE — PROCEDURE
[FreeTextEntry1] : PFTs revealed normal flows; FEV1 was 2.01L, which is 92.4% of predicted; normal flow volume loop. PFTs were performed to evaluate for SOB, asthma  Chest CT (5/10/2024) revealed mild emphysema, anterior subpleural radiation fibrosis in the LINDA, and stable nodules.   FENO was 17; a normal value being less than 25 Fractional exhaled nitric oxide (FENO) is regarded as a simple, noninvasive method for assessing eosinophilic airway inflammation. Produced by a variety of cells within the lung, nitric oxide (NO) concentrations are generally low in healthy individuals. However, high concentrations of NO appear to be involved in nonspecific host defense mechanisms and chronic inflammatory diseases such as asthma. The American Thoracic Society (ATS) therefore has recommended using FENO to aid in the diagnosis and monitoring of eosinophilic airway inflammation and asthma, and for identifying steroid responsive individuals whose chronic respiratory symptoms may be caused by airway inflammation.

## 2024-05-17 NOTE — HISTORY OF PRESENT ILLNESS
[FreeTextEntry1] : Ms. Grace is a 69 year old male with a history of abnormal chest CT, asthma, allergic rhinitis, lung nodule, morbid obesity, JETT, other emphysema, and SOB presenting to the office today for a follow-up pulmonary evaluation. Her chief complaint is  -she notes she's lost a total of 75-80 lbs since being on Mounjaro. She doesn't want to lose any more weight so she doesn't get additional skin sagging -she notes her UizoO6L went from 6.3 to 5.3 -she notes rhinitis, triggered by cold air/whenever she goes outside. Ipratropium bromide helps her the best -she notes her breathing is significantly improved -she notes her prior RHOADES with 6 stairs has completely resolved -she notes exercising (dumbbells, squats, walking) -she notes using her CPAP every night and tolerating it well -she denies adequate hydration -she notes eating a low carbohydrate diet  -she denies any headaches, nausea, emesis, fever, chills, sweats, chest pain, chest pressure, coughing, wheezing, palpitations, diarrhea, constipation, dysphagia, vertigo, arthralgias, myalgias, leg swelling, itchy eyes, itchy ears, heartburn, reflux, or sour taste in the mouth.

## 2024-05-17 NOTE — ADDENDUM
[FreeTextEntry1] : Documented by Inga Persaud acting as a scribe for Dr. Virgilio Marie on 05/17/2024. All medical record entries made by the Scribe were at my, Dr. Virgilio Marie's, direction and personally dictated by me on 05/17/2024. I have reviewed the chart and agree that the record accurately reflects my personal performance of the history, physical exam, assessment and plan. I have also personally directed, reviewed, and agree with the discharge instructions.

## 2024-05-17 NOTE — ASSESSMENT
[FreeTextEntry1] : Ms. Grace is a 66 year old female with a history of aortic stenosis, s/p TAVR obesity s/p lap band surgery placed and removed, HLD, HTN, breast cancer, JETT, COPD, asthma, and allergic rhinitis- improved with 80 lbs weight loss on Mounjaro  problem 1: allergies/sinus -continue Atrovent 0.6% 1 sniff each nostril BID -Continue Astelin 0.15 2 sniffs BID -Environmental measures for allergies were encouraged including mattress and pillow covers, air purifier, and environmental controls.   problem 2: asthma/COPD (Stable)  -continue nebulizer with DuoNeb QID prn -Continue Advair 250 at 1 inhalation BID -off of Spiriva 1 inhalation QD at the given time  -off of Singulair 10 mg QHS at the given time  -Asthma is  believed to be caused by inherited (genetic) and environmental factor, but its exact cause is unknown. Asthma may be triggered by allergens, lung infections, or irritants in the air. Asthma triggers are different for each person -Inhaler technique reviewed as well as oral hygiene techniques reviewed with patient. Avoidance of cold air, extremes of temperature, rescue inhaler should be used before exercise. Order of medication reviewed with patient. Recommended use of a cool mist humidifier in the bedroom.  problem 3: lung cancer screening- Stable  -f/u chest CT 5/2025 -The American Cancer Society now recommends that individuals aged 50 to 80 years who currently smoke, or formerly smoked, and are at high risk for lung cancer because of a >20 pack-year history of cigarette smoking undergo annual lung cancer screening with LDCT. The recommendations eliminated years since quitting as a criterium for evaluation for lung cancer screening.   problem 4: obesity  -on Mounjaro- 80 lbs down -Weight loss, exercise, and diet control were discussed and are highly encouraged. Treatment options were given such as, aqua therapy, and contacting a nutritionist. Recommended to use the elliptical, stationary bike, less use of treadmill.  Obesity is associated with worsening asthma, shortness of breath, and potential for cardiac disease, diabetes, and other underlying medical conditions.  problem 5: JETT -complete/repeat home sleep study 5/2024 -continue to use the CPAP machine, tolerating it well, and seeing the benefits -Patient is using her CPAP consistently "dreamstation" from [employer]   -Sleep apnea is associated with adverse clinical consequences which an affect most organ systems.  Cardiovascular disease risk includes arrhythmias, atrial fibrillation, hypertension, coronary artery disease, and stroke. Metabolic disorders include diabetes type 2, non-alcoholic fatty liver disease. Mood disorder especially depression; and cognitive decline especially in the elderly. Associations with  chronic reflux/Arauz's esophagus some but not all inclusive.  -Reasons to assess this include arousal consistent with hypopnea; respiratory events most prominent in REM sleep or supine position; therefore sleep staging and body position are important for accurate diagnosis and estimation of AHI.  problem 7: cardiac disease -f/p with Dr. Goldberg/Jeremías  -s/p TAVR/ Stent   Problem 8: NM disease "foot" - Continue Lyrica 50 mg QD - S/p thoracic MRI without contrast: T9-T10 herniation- U/P "shots" - Recommended to use the P3 cream  problem 8: health maintenance - She received an influenza vaccine 2023 -recommended strep pneumonia vaccines: Prevnar-13 vaccine, followed by Pneumo vaccine 23 on year following -recommended early intervention for URIs -recommended osteoporosis evaluations -recommended early dermatological evaluations -recommended after the age of 50 to the age of 70, colonoscopy every 5 years -encouraged early intervention  F/P in 4 months She is encouraged to call with any changes, concerns, or questions

## 2025-03-27 ENCOUNTER — RX RENEWAL (OUTPATIENT)
Age: 70
End: 2025-03-27

## 2025-04-11 ENCOUNTER — APPOINTMENT (OUTPATIENT)
Dept: PULMONOLOGY | Facility: CLINIC | Age: 70
End: 2025-04-11
Payer: MEDICARE

## 2025-04-11 ENCOUNTER — NON-APPOINTMENT (OUTPATIENT)
Age: 70
End: 2025-04-11

## 2025-04-11 VITALS
SYSTOLIC BLOOD PRESSURE: 110 MMHG | OXYGEN SATURATION: 98 % | DIASTOLIC BLOOD PRESSURE: 70 MMHG | WEIGHT: 245 LBS | BODY MASS INDEX: 43.41 KG/M2 | TEMPERATURE: 97.3 F | HEIGHT: 63 IN | HEART RATE: 73 BPM | RESPIRATION RATE: 16 BRPM

## 2025-04-11 DIAGNOSIS — R93.89 ABNORMAL FINDINGS ON DIAGNOSTIC IMAGING OF OTHER SPECIFIED BODY STRUCTURES: ICD-10-CM

## 2025-04-11 DIAGNOSIS — J30.9 ALLERGIC RHINITIS, UNSPECIFIED: ICD-10-CM

## 2025-04-11 DIAGNOSIS — J45.909 UNSPECIFIED ASTHMA, UNCOMPLICATED: ICD-10-CM

## 2025-04-11 DIAGNOSIS — R06.02 SHORTNESS OF BREATH: ICD-10-CM

## 2025-04-11 DIAGNOSIS — J30.0 VASOMOTOR RHINITIS: ICD-10-CM

## 2025-04-11 DIAGNOSIS — G47.33 OBSTRUCTIVE SLEEP APNEA (ADULT) (PEDIATRIC): ICD-10-CM

## 2025-04-11 PROCEDURE — ZZZZZ: CPT

## 2025-04-11 PROCEDURE — 94729 DIFFUSING CAPACITY: CPT

## 2025-04-11 PROCEDURE — 95012 NITRIC OXIDE EXP GAS DETER: CPT

## 2025-04-11 PROCEDURE — 94727 GAS DIL/WSHOT DETER LNG VOL: CPT

## 2025-04-11 PROCEDURE — 99214 OFFICE O/P EST MOD 30 MIN: CPT | Mod: 25

## 2025-04-11 PROCEDURE — 94010 BREATHING CAPACITY TEST: CPT

## 2025-05-02 ENCOUNTER — RX RENEWAL (OUTPATIENT)
Age: 70
End: 2025-05-02

## 2025-06-20 ENCOUNTER — RX RENEWAL (OUTPATIENT)
Age: 70
End: 2025-06-20

## 2025-08-19 ENCOUNTER — RX RENEWAL (OUTPATIENT)
Age: 70
End: 2025-08-19